# Patient Record
Sex: FEMALE | Race: AMERICAN INDIAN OR ALASKA NATIVE
[De-identification: names, ages, dates, MRNs, and addresses within clinical notes are randomized per-mention and may not be internally consistent; named-entity substitution may affect disease eponyms.]

---

## 2020-11-11 ENCOUNTER — APPOINTMENT (OUTPATIENT)
Dept: CARDIOLOGY | Facility: CLINIC | Age: 85
End: 2020-11-11
Payer: MEDICARE

## 2020-11-11 VITALS
HEART RATE: 73 BPM | BODY MASS INDEX: 19.69 KG/M2 | SYSTOLIC BLOOD PRESSURE: 170 MMHG | WEIGHT: 107 LBS | HEIGHT: 62 IN | RESPIRATION RATE: 12 BRPM | DIASTOLIC BLOOD PRESSURE: 82 MMHG

## 2020-11-11 DIAGNOSIS — R94.31 ABNORMAL ELECTROCARDIOGRAM [ECG] [EKG]: ICD-10-CM

## 2020-11-11 DIAGNOSIS — R07.9 CHEST PAIN, UNSPECIFIED: ICD-10-CM

## 2020-11-11 PROBLEM — Z00.00 ENCOUNTER FOR PREVENTIVE HEALTH EXAMINATION: Status: ACTIVE | Noted: 2020-11-11

## 2020-11-11 PROCEDURE — 99204 OFFICE O/P NEW MOD 45 MIN: CPT

## 2020-11-11 RX ORDER — HYDROXYUREA 500 MG/1
500 CAPSULE ORAL
Refills: 0 | Status: ACTIVE | COMMUNITY
Start: 2020-11-11

## 2020-11-11 RX ORDER — ASPIRIN 81 MG/1
81 TABLET ORAL
Qty: 30 | Refills: 2 | Status: ACTIVE | COMMUNITY
Start: 2020-11-11

## 2020-11-11 NOTE — DISCUSSION/SUMMARY
[FreeTextEntry1] : 85 F hyperlipidemia thrombocystosis with CP, SOB and abnormal EKG.\par CHECK ECHOCARDIOGRAM.\par CHECK NST TO ASSESS PERFUSION (limited ET).\par Continue statin.\par START ASA 81 mg daily.

## 2020-11-11 NOTE — REASON FOR VISIT
[Initial Evaluation] : an initial evaluation of [Abnormal ECG] : an abnormal ECG [Chest Pain] : chest pain [Dyspnea] : dyspnea [Hyperlipidemia] : hyperlipidemia [FreeTextEntry1] : 85 F thrombocystosis hyperlipidemia with CP, SOB and abnormal EKG.

## 2020-11-11 NOTE — HISTORY OF PRESENT ILLNESS
[FreeTextEntry1] : 1. Hyperlipidemia: on statin.\par 2. Abnormal EKG: she was found to have T wave inversions on EKG.\par Patient has noted intermittent CP, midline, substernal, exertional, progressive and worsening over several months. She has noted SOB with exertion as well. Her ET is limited by leg pain. She has noted symptoms occurring daily, increasing in frequency and duration. She also has intermittent dizziness but no palpitations.

## 2020-11-11 NOTE — PHYSICAL EXAM
[General Appearance - Well Developed] : well developed [Normal Appearance] : normal appearance [Well Groomed] : well groomed [General Appearance - Well Nourished] : well nourished [No Deformities] : no deformities [General Appearance - In No Acute Distress] : no acute distress [Normal Conjunctiva] : the conjunctiva exhibited no abnormalities [Eyelids - No Xanthelasma] : the eyelids demonstrated no xanthelasmas [Normal Oral Mucosa] : normal oral mucosa [No Oral Pallor] : no oral pallor [No Oral Cyanosis] : no oral cyanosis [Heart Rate And Rhythm] : heart rate and rhythm were normal [Heart Sounds] : normal S1 and S2 [Respiration, Rhythm And Depth] : normal respiratory rhythm and effort [Exaggerated Use Of Accessory Muscles For Inspiration] : no accessory muscle use [Auscultation Breath Sounds / Voice Sounds] : lungs were clear to auscultation bilaterally [Abdomen Soft] : soft [Abdomen Tenderness] : non-tender [Abdomen Mass (___ Cm)] : no abdominal mass palpated [Nail Clubbing] : no clubbing of the fingernails [Cyanosis, Localized] : no localized cyanosis [Petechial Hemorrhages (___cm)] : no petechial hemorrhages [Skin Color & Pigmentation] : normal skin color and pigmentation [] : no rash [No Venous Stasis] : no venous stasis [Skin Lesions] : no skin lesions [No Skin Ulcers] : no skin ulcer [No Xanthoma] : no  xanthoma was observed [Oriented To Time, Place, And Person] : oriented to person, place, and time [Affect] : the affect was normal [Mood] : the mood was normal [No Anxiety] : not feeling anxious [FreeTextEntry1] : 2/6 LAURENCE PEREZ

## 2020-12-02 ENCOUNTER — APPOINTMENT (OUTPATIENT)
Dept: CARDIOLOGY | Facility: CLINIC | Age: 85
End: 2020-12-02
Payer: MEDICARE

## 2020-12-02 VITALS
DIASTOLIC BLOOD PRESSURE: 70 MMHG | BODY MASS INDEX: 19.69 KG/M2 | WEIGHT: 107 LBS | HEIGHT: 62 IN | SYSTOLIC BLOOD PRESSURE: 160 MMHG

## 2020-12-02 DIAGNOSIS — I10 ESSENTIAL (PRIMARY) HYPERTENSION: ICD-10-CM

## 2020-12-02 DIAGNOSIS — R06.02 SHORTNESS OF BREATH: ICD-10-CM

## 2020-12-02 DIAGNOSIS — E78.5 HYPERLIPIDEMIA, UNSPECIFIED: ICD-10-CM

## 2020-12-02 PROCEDURE — 99214 OFFICE O/P EST MOD 30 MIN: CPT

## 2020-12-02 PROCEDURE — 93000 ELECTROCARDIOGRAM COMPLETE: CPT

## 2020-12-02 RX ORDER — SIMVASTATIN 20 MG/1
20 TABLET, FILM COATED ORAL DAILY
Refills: 0 | Status: ACTIVE | COMMUNITY

## 2020-12-02 RX ORDER — OLMESARTAN MEDOXOMIL AND HYDROCHLOROTHIAZIDE 20; 12.5 MG/1; MG/1
20-12.5 TABLET ORAL DAILY
Qty: 90 | Refills: 1 | Status: ACTIVE | COMMUNITY
Start: 2020-12-02 | End: 1900-01-01

## 2020-12-02 RX ORDER — METOPROLOL SUCCINATE 50 MG/1
50 TABLET, EXTENDED RELEASE ORAL DAILY
Qty: 30 | Refills: 0 | Status: ACTIVE | COMMUNITY
Start: 2020-11-11

## 2020-12-02 RX ORDER — ATORVASTATIN CALCIUM 10 MG/1
10 TABLET, FILM COATED ORAL
Refills: 0 | Status: DISCONTINUED | COMMUNITY
Start: 2020-11-11 | End: 2020-12-02

## 2020-12-02 NOTE — PHYSICAL EXAM
[General Appearance - Well Developed] : well developed [Normal Appearance] : normal appearance [Well Groomed] : well groomed [General Appearance - Well Nourished] : well nourished [No Deformities] : no deformities [General Appearance - In No Acute Distress] : no acute distress [Normal Conjunctiva] : the conjunctiva exhibited no abnormalities [Eyelids - No Xanthelasma] : the eyelids demonstrated no xanthelasmas [Normal Rate] : normal [Rhythm Regular] : regular [Normal S1] : normal S1 [Normal S2] : normal S2 [S3] : no S3 [S4] : an S4 was heard [II] : a grade 2 [2+] : left 2+ [No Pitting Edema] : no pitting edema present [Respiration, Rhythm And Depth] : normal respiratory rhythm and effort [Exaggerated Use Of Accessory Muscles For Inspiration] : no accessory muscle use [Auscultation Breath Sounds / Voice Sounds] : lungs were clear to auscultation bilaterally [Bowel Sounds] : normal bowel sounds [Abdomen Soft] : soft [Abdomen Tenderness] : non-tender [Abdomen Mass (___ Cm)] : no abdominal mass palpated [Cyanosis, Localized] : no localized cyanosis [Skin Color & Pigmentation] : normal skin color and pigmentation [] : no rash [Oriented To Time, Place, And Person] : oriented to person, place, and time

## 2020-12-02 NOTE — HISTORY OF PRESENT ILLNESS
[FreeTextEntry1] : 85-yo Sinhala-speaking female with h/o HTN for many years, still poorly controlled. Patient c/o progressive CONTRERAS in the last 1-2 years. She is able to walk slowly on a level surface but gets out of breath immediately with any incline or stairs. She denies CP now although she has experienced it in the past.\par \par She also has a h/o hyperlipidemia. She has been taking Hydroxyurea for many years, not sure why.

## 2020-12-02 NOTE — ASSESSMENT
[FreeTextEntry1] : HTN.\par SOB, hypertensive and ischemic heart disease need to be ruled out.\par Hyperlipidemia.\par ? indication for Hydroxyurea.\par \par Plan:\par Continue Metoprolol.\par Add Olmesartan HCT in the morning.\par Continue Simvastatin.\par 2D ECHO.\par Adenosine MPI.\par Blood work ordered.\par Hematology evaluation.\par \par Art Caicedo MD\par

## 2020-12-02 NOTE — REVIEW OF SYSTEMS
[Blurry Vision] : no blurred vision [Seeing Double (Diplopia)] : no diplopia [see HPI] : see HPI [Lower Ext Edema] : no extremity edema [Leg Claudication] : no intermittent leg claudication [Palpitations] : no palpitations [Skin: A Rash] : no rash: [Anxiety] : no anxiety [Easy Bleeding] : no tendency for easy bleeding [Easy Bruising] : no tendency for easy bruising [Negative] : Heme/Lymph

## 2020-12-04 ENCOUNTER — TRANSCRIPTION ENCOUNTER (OUTPATIENT)
Age: 85
End: 2020-12-04

## 2020-12-21 ENCOUNTER — APPOINTMENT (OUTPATIENT)
Dept: CARDIOLOGY | Facility: CLINIC | Age: 85
End: 2020-12-21
Payer: MEDICARE

## 2020-12-21 PROCEDURE — 93306 TTE W/DOPPLER COMPLETE: CPT

## 2020-12-23 ENCOUNTER — APPOINTMENT (OUTPATIENT)
Dept: CARDIOLOGY | Facility: CLINIC | Age: 85
End: 2020-12-23

## 2020-12-23 ENCOUNTER — RESULT REVIEW (OUTPATIENT)
Age: 85
End: 2020-12-23

## 2020-12-23 ENCOUNTER — OUTPATIENT (OUTPATIENT)
Dept: OUTPATIENT SERVICES | Facility: HOSPITAL | Age: 85
LOS: 1 days | Discharge: HOME | End: 2020-12-23
Payer: MEDICARE

## 2020-12-23 DIAGNOSIS — R06.02 SHORTNESS OF BREATH: ICD-10-CM

## 2020-12-23 PROCEDURE — 78452 HT MUSCLE IMAGE SPECT MULT: CPT | Mod: 26

## 2021-02-19 ENCOUNTER — APPOINTMENT (OUTPATIENT)
Dept: CARDIOLOGY | Facility: CLINIC | Age: 86
End: 2021-02-19

## 2022-05-26 ENCOUNTER — TRANSCRIPTION ENCOUNTER (OUTPATIENT)
Age: 87
End: 2022-05-26

## 2023-10-03 ENCOUNTER — EMERGENCY (EMERGENCY)
Facility: HOSPITAL | Age: 88
LOS: 0 days | Discharge: HOME | End: 2023-10-03
Attending: EMERGENCY MEDICINE
Payer: MEDICARE

## 2023-10-03 VITALS
HEART RATE: 94 BPM | OXYGEN SATURATION: 98 % | TEMPERATURE: 97 F | SYSTOLIC BLOOD PRESSURE: 143 MMHG | DIASTOLIC BLOOD PRESSURE: 66 MMHG | WEIGHT: 89.51 LBS | RESPIRATION RATE: 14 BRPM

## 2023-10-03 VITALS — SYSTOLIC BLOOD PRESSURE: 138 MMHG | HEART RATE: 72 BPM | RESPIRATION RATE: 18 BRPM | DIASTOLIC BLOOD PRESSURE: 72 MMHG

## 2023-10-03 DIAGNOSIS — H53.8 OTHER VISUAL DISTURBANCES: ICD-10-CM

## 2023-10-03 DIAGNOSIS — E78.5 HYPERLIPIDEMIA, UNSPECIFIED: ICD-10-CM

## 2023-10-03 DIAGNOSIS — H43.11 VITREOUS HEMORRHAGE, RIGHT EYE: ICD-10-CM

## 2023-10-03 DIAGNOSIS — H40.9 UNSPECIFIED GLAUCOMA: ICD-10-CM

## 2023-10-03 DIAGNOSIS — D75.839 THROMBOCYTOSIS, UNSPECIFIED: ICD-10-CM

## 2023-10-03 LAB
ACANTHOCYTES BLD QL SMEAR: SLIGHT — SIGNIFICANT CHANGE UP
ALBUMIN SERPL ELPH-MCNC: 4.2 G/DL — SIGNIFICANT CHANGE UP (ref 3.5–5.2)
ALP SERPL-CCNC: 46 U/L — SIGNIFICANT CHANGE UP (ref 30–115)
ALT FLD-CCNC: 11 U/L — SIGNIFICANT CHANGE UP (ref 0–41)
ANION GAP SERPL CALC-SCNC: 9 MMOL/L — SIGNIFICANT CHANGE UP (ref 7–14)
ANISOCYTOSIS BLD QL: SLIGHT — SIGNIFICANT CHANGE UP
APTT BLD: 35 SEC — SIGNIFICANT CHANGE UP (ref 27–39.2)
AST SERPL-CCNC: 30 U/L — SIGNIFICANT CHANGE UP (ref 0–41)
BASOPHILS # BLD AUTO: 0.1 K/UL — SIGNIFICANT CHANGE UP (ref 0–0.2)
BASOPHILS NFR BLD AUTO: 1.7 % — HIGH (ref 0–1)
BILIRUB SERPL-MCNC: <0.2 MG/DL — SIGNIFICANT CHANGE UP (ref 0.2–1.2)
BUN SERPL-MCNC: 23 MG/DL — HIGH (ref 10–20)
BURR CELLS BLD QL SMEAR: PRESENT — SIGNIFICANT CHANGE UP
CALCIUM SERPL-MCNC: 8.8 MG/DL — SIGNIFICANT CHANGE UP (ref 8.4–10.5)
CHLORIDE SERPL-SCNC: 103 MMOL/L — SIGNIFICANT CHANGE UP (ref 98–110)
CO2 SERPL-SCNC: 27 MMOL/L — SIGNIFICANT CHANGE UP (ref 17–32)
CREAT SERPL-MCNC: 0.8 MG/DL — SIGNIFICANT CHANGE UP (ref 0.7–1.5)
DACRYOCYTES BLD QL SMEAR: SLIGHT — SIGNIFICANT CHANGE UP
EGFR: 71 ML/MIN/1.73M2 — SIGNIFICANT CHANGE UP
EOSINOPHIL # BLD AUTO: 0.1 K/UL — SIGNIFICANT CHANGE UP (ref 0–0.7)
EOSINOPHIL NFR BLD AUTO: 1.8 % — SIGNIFICANT CHANGE UP (ref 0–8)
GIANT PLATELETS BLD QL SMEAR: PRESENT — SIGNIFICANT CHANGE UP
GLUCOSE SERPL-MCNC: 135 MG/DL — HIGH (ref 70–99)
HCT VFR BLD CALC: 32.4 % — LOW (ref 37–47)
HGB BLD-MCNC: 10.4 G/DL — LOW (ref 12–16)
INR BLD: 0.91 RATIO — SIGNIFICANT CHANGE UP (ref 0.65–1.3)
LYMPHOCYTES # BLD AUTO: 0.49 K/UL — LOW (ref 1.2–3.4)
LYMPHOCYTES # BLD AUTO: 8.6 % — LOW (ref 20.5–51.1)
MACROCYTES BLD QL: SLIGHT — SIGNIFICANT CHANGE UP
MANUAL SMEAR VERIFICATION: SIGNIFICANT CHANGE UP
MCHC RBC-ENTMCNC: 32.1 G/DL — SIGNIFICANT CHANGE UP (ref 32–37)
MCHC RBC-ENTMCNC: 35.4 PG — HIGH (ref 27–31)
MCV RBC AUTO: 110.2 FL — HIGH (ref 81–99)
MONOCYTES # BLD AUTO: 0.25 K/UL — SIGNIFICANT CHANGE UP (ref 0.1–0.6)
MONOCYTES NFR BLD AUTO: 4.3 % — SIGNIFICANT CHANGE UP (ref 1.7–9.3)
NEUTROPHILS # BLD AUTO: 4.13 K/UL — SIGNIFICANT CHANGE UP (ref 1.4–6.5)
NEUTROPHILS NFR BLD AUTO: 72.4 % — SIGNIFICANT CHANGE UP (ref 42.2–75.2)
OVALOCYTES BLD QL SMEAR: SLIGHT — SIGNIFICANT CHANGE UP
PLAT MORPH BLD: ABNORMAL
PLATELET # BLD AUTO: 480 K/UL — HIGH (ref 130–400)
PMV BLD: 9.7 FL — SIGNIFICANT CHANGE UP (ref 7.4–10.4)
POIKILOCYTOSIS BLD QL AUTO: SLIGHT — SIGNIFICANT CHANGE UP
POLYCHROMASIA BLD QL SMEAR: SLIGHT — SIGNIFICANT CHANGE UP
POTASSIUM SERPL-MCNC: 5.2 MMOL/L — HIGH (ref 3.5–5)
POTASSIUM SERPL-SCNC: 5.2 MMOL/L — HIGH (ref 3.5–5)
PROT SERPL-MCNC: 6.7 G/DL — SIGNIFICANT CHANGE UP (ref 6–8)
PROTHROM AB SERPL-ACNC: 10.3 SEC — SIGNIFICANT CHANGE UP (ref 9.95–12.87)
RBC # BLD: 2.94 M/UL — LOW (ref 4.2–5.4)
RBC # FLD: 12.8 % — SIGNIFICANT CHANGE UP (ref 11.5–14.5)
RBC BLD AUTO: ABNORMAL
SMUDGE CELLS # BLD: PRESENT — SIGNIFICANT CHANGE UP
SODIUM SERPL-SCNC: 139 MMOL/L — SIGNIFICANT CHANGE UP (ref 135–146)
STOMATOCYTES BLD QL SMEAR: SLIGHT — SIGNIFICANT CHANGE UP
TROPONIN T SERPL-MCNC: <0.01 NG/ML — SIGNIFICANT CHANGE UP
VARIANT LYMPHS # BLD: 11.2 % — HIGH (ref 0–5)
WBC # BLD: 5.7 K/UL — SIGNIFICANT CHANGE UP (ref 4.8–10.8)
WBC # FLD AUTO: 5.7 K/UL — SIGNIFICANT CHANGE UP (ref 4.8–10.8)

## 2023-10-03 PROCEDURE — 82962 GLUCOSE BLOOD TEST: CPT

## 2023-10-03 PROCEDURE — 70450 CT HEAD/BRAIN W/O DYE: CPT | Mod: 26,MA,XU

## 2023-10-03 PROCEDURE — 70498 CT ANGIOGRAPHY NECK: CPT | Mod: MA

## 2023-10-03 PROCEDURE — 36415 COLL VENOUS BLD VENIPUNCTURE: CPT

## 2023-10-03 PROCEDURE — 70496 CT ANGIOGRAPHY HEAD: CPT | Mod: MA

## 2023-10-03 PROCEDURE — 0042T: CPT | Mod: MA

## 2023-10-03 PROCEDURE — 70498 CT ANGIOGRAPHY NECK: CPT | Mod: 26,MA

## 2023-10-03 PROCEDURE — 99283 EMERGENCY DEPT VISIT LOW MDM: CPT

## 2023-10-03 PROCEDURE — 76512 OPH US DX B-SCAN: CPT | Mod: 26,RT

## 2023-10-03 PROCEDURE — 85730 THROMBOPLASTIN TIME PARTIAL: CPT

## 2023-10-03 PROCEDURE — 93010 ELECTROCARDIOGRAM REPORT: CPT

## 2023-10-03 PROCEDURE — 85025 COMPLETE CBC W/AUTO DIFF WBC: CPT

## 2023-10-03 PROCEDURE — 99285 EMERGENCY DEPT VISIT HI MDM: CPT | Mod: 25

## 2023-10-03 PROCEDURE — 76512 OPH US DX B-SCAN: CPT | Mod: RT

## 2023-10-03 PROCEDURE — 70496 CT ANGIOGRAPHY HEAD: CPT | Mod: 26,MA

## 2023-10-03 PROCEDURE — 80053 COMPREHEN METABOLIC PANEL: CPT

## 2023-10-03 PROCEDURE — 93005 ELECTROCARDIOGRAM TRACING: CPT

## 2023-10-03 PROCEDURE — 70450 CT HEAD/BRAIN W/O DYE: CPT | Mod: MA

## 2023-10-03 PROCEDURE — 99285 EMERGENCY DEPT VISIT HI MDM: CPT

## 2023-10-03 PROCEDURE — 84484 ASSAY OF TROPONIN QUANT: CPT

## 2023-10-03 PROCEDURE — 85610 PROTHROMBIN TIME: CPT

## 2023-10-03 NOTE — ED PROVIDER NOTE - ATTENDING CONTRIBUTION TO CARE
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  Patient denies any eye pain.  No headache.  No speech changes.  No paresthesias or motor weakness.  No ataxia or dizziness.  No recent eye trauma.  No diplopia.  Patient had cataract surgery to the right eye.  Stroke code was activated in triage.  Vitals noted.  CONSTITUTIONAL: Well-appearing; thin; in no apparent distress.   HEAD: Normocephalic; atraumatic.   EYES: PERRL; EOM intact. Conjunctiva normal B/L. No APD.   ENT: Normal pharynx with no tonsillar hypertrophy. MMM.  NECK: Supple; non-tender; no cervical lymphadenopathy.   CHEST: Normal chest excursion with respiration.   CARDIOVASCULAR: Normal S1, S2; no murmurs, rubs, or gallops.   RESPIRATORY: Normal chest excursion with respiration; breath sounds clear and equal bilaterally; no wheezes, rhonchi, or rales.  GI/: Normal bowel sounds; non-distended; non-tender.  BACK: No evidence of trauma or deformity. Non-tender to palpation. No CVA tenderness.   EXT: Normal ROM in all four extremities; non-tender to palpation; distal pulses are normal. No leg edema B/L.   SKIN: Normal for age and race; warm; dry; good turgor.  NEURO: A & O x 4; CN 2-12 intact. R eye vision to movement only. L eye vision normal. No facial droop.  5/5 motor strength bilateral upper and bilateral lower extremities.  Normal finger-nose bilaterally.  No sensory deficit.

## 2023-10-03 NOTE — CONSULT NOTE ADULT - SUBJECTIVE AND OBJECTIVE BOX
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    Vision loss noted at 12:00 PM today (10/3/23), patient called as stroke code when evaluated in ED. Stroke code workup imaging negative, TPA not administered due to vitreous hemorrhage identified by ED attending at time of initial workup. Patient reports that since onset of vision decline, vision has gradually improved. Vision OD currently continues to be blurry but is improving. Patient reports at time of onset, patient was able to see shadows and shapes, not complete loss of vision. Patient denies pain, flashes, floaters, sudden loss of vision. Patient denies eyedrop use currently.    Examination:  External: unremarkable OU    Anterior examination:  Lids/Lashes: Unremarkable OU  Conjunctiva: White and Quiet OU  Cornea: Clear OU  Anterior Chamber: Formed OU  Iris: Iris tear 5:00 position OD, surgical pupil slightly peaked OD, round and reactive OS  Lens: PCIOL OD, 1-2+ NSC with cortical involvement OS  Vitreous: Hemorrhage OD, PVD OS    Dilated Examination:  Examination limited OD due to vitreous hemorrhage  Disc: C/D 0.4 OU, significant peripapillary atrophy  Macula: Flat OS, unable to assess OD  Vessels: unremarkable OS, unable to assess OD  Periphery: Unremarkable OS, unable to assess OD    B-Scan:  OD: PVD, vitreous hemorrhage concentrated inferiorly, no evidence of RD  OS: PVD, no evidence of RD This is a 87 yo F with PMH HLD, thrombocytosis, 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    Vision loss noted at 12:00 PM today (10/3/23), patient called as stroke code when evaluated in ED. Stroke code workup imaging negative, TPA not administered due to vitreous hemorrhage identified by ED attending at time of initial workup. Patient reports that since onset of vision decline, vision has gradually improved. Vision OD currently continues to be blurry but is improving. Patient reports at time of onset, patient was able to see shadows and shapes, not complete loss of vision. Patient denies pain, flashes, floaters, sudden loss of vision. Patient denies eyedrop use currently.    Examination:  Vision: 20/200 PH NI OD; 20/40 PH NI OS  EOM Full OU without restriction  Color plates full OU  IOP 24/12  Pupils equal round and reactive OU    External: unremarkable OU    Anterior examination:  Lids/Lashes: Unremarkable OU  Conjunctiva: White and Quiet OU  Cornea: Clear OU  Anterior Chamber: Formed OU  Iris: Iris tear 5:00 position OD, surgical pupil slightly peaked OD, round and reactive OS  Lens: PCIOL OD, 1-2+ NSC with cortical involvement OS  Vitreous: Hemorrhage OD, PVD OS    Dilated Examination:  Examination limited OD due to vitreous hemorrhage  Disc: C/D 0.4 OU, significant peripapillary atrophy  Macula: Flat OS, unable to assess OD  Vessels: unremarkable OS, unable to assess OD  Periphery: Unremarkable OS, unable to assess OD    B-Scan:  OD: PVD, vitreous hemorrhage concentrated inferiorly, no evidence of RD  OS: PVD, no evidence of RD

## 2023-10-03 NOTE — ED PROVIDER NOTE - CLINICAL SUMMARY MEDICAL DECISION MAKING FREE TEXT BOX
88-year-old female presents to the ED for right eye vision loss.  Stroke code was called.  Last well-known time at 12:30 PM.  Concern for central artery retinal occlusion and was consulted ophthalmology at bedside.  Bedside ultrasound of the right ocular region revealed a possible right vitreous hemorrhage.  Not a candidate for TNK.  She is on a remainder of the CT imaging unremarkable for LVO.  Case was signed out to me pending ophthalmology evaluation.  Ophthalmology was consulted.  Recommendation for outpatient follow-up for possible vitreous hemorrhage overall patient is well-appearing we will follow-up with outpatient ophthalmology.  Precautions for vitreous hemorrhage/detachment advised patient.  Require to follow-up in the next 1 to 2 days.  Patient understood plan.  Family at bedside understood plan.  Discharged home.

## 2023-10-03 NOTE — CONSULT NOTE ADULT - ATTENDING COMMENTS
Monocular vision loss OD, is s/p CE OD, vitreous hemorrhage on B scan. 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 The elevated IOP with vitreous hemorrhage may  be evidence of an UGH Syndrome, needs exam in office with slit lamp, but can explain the hemorrhage, elevated IOP and the implant lens movement in association with the exfoliative material on the iris. Will see in the [facility] next

## 2023-10-03 NOTE — CONSULT NOTE ADULT - SUBJECTIVE AND OBJECTIVE BOX
**STROKE CODE CONSULT NOTE**    Last known well time:     HPI:  The patient is a 88 year old female     PAST MEDICAL & SURGICAL HISTORY:      FAMILY HISTORY:      SOCIAL HISTORY:  Denies smoking, drinking, or drug use    ROS:  Constitutional: No fever, weight loss or fatigue  Eyes: No eye pain, visual disturbances, or discharge  ENMT:  No difficulty hearing, tinnitus, vertigo; No sinus or throat pain  Neck: No pain or stiffness  Respiratory: No cough, wheezing, chills or hemoptysis  Cardiovascular: No chest pain, palpitations, shortness of breath, dizziness or leg swelling  Gastrointestinal: No abdominal pain. No nausea, vomiting or hematemesis; No diarrhea or constipation. Nohematochezia.  Genitourinary: No dysuria, frequency, hematuria or incontinence  Neurological: As per HPI      MEDICATIONS  (STANDING):    MEDICATIONS  (PRN):      Allergies    No Known Allergies    Intolerances        Vital Signs Last 24 Hrs  T(C): 36.1 (03 Oct 2023 15:31), Max: 36.1 (03 Oct 2023 15:31)  T(F): 97 (03 Oct 2023 15:31), Max: 97 (03 Oct 2023 15:31)  HR: 85 (03 Oct 2023 16:33) (85 - 94)  BP: 156/74 (03 Oct 2023 16:33) (143/66 - 156/74)  BP(mean): --  RR: 18 (03 Oct 2023 16:33) (14 - 18)  SpO2: 99% (03 Oct 2023 16:33) (98% - 99%)    Parameters below as of 03 Oct 2023 16:33  Patient On (Oxygen Delivery Method): room air        PHYSICAL EXAM:  Constitutional: WDWN; NAD    Neurologic:  -Mental status: Awake, alert and oriented to person, age, and month. Speech is fluent with intact naming, able to describe picture in full.  Recent and remote memory intact.  Attention/concentration intact.  No dysarthria, no aphasia.  Fund of knowledge appropriate.    -Cranial nerves:  II: Visual fields full to confrontation. Pupils PERRL  III, IV, VI: extraocular movements are intact without nystagmus  V: Facial sensation intact V1 through V3 intact bilaterally  VII: Face is symmetric with normal eye closure and smile.  VIII: hearing is intact to finger rub  IX, X: uvula is midline and soft palate rises symmetrically  XI: Head turning and shoulder shrug intact  XII: Tongue protrudes in the midline    -Motor:  Normal bulk and tone, strength 5/5 in bilateral upper and lower extremities.   strength 5/5.    -Sensation: Intact to light touch.  No neglect.   -Coordination: No dysmetria on finger-to-nose and heel-to-shin.  No clumsiness.  -Reflexes:downgoing toes bilaterally  -Gait: Narrow and steady. No ataxia.  Romberg negative    NIHSS: 2 for right eye vision loss     Fingerstick Blood Glucose: CAPILLARY BLOOD GLUCOSE  147 (03 Oct 2023 16:31)      POCT Blood Glucose.: 147 mg/dL (03 Oct 2023 15:33)       LABS:                        10.4   5.70  )-----------( 480      ( 03 Oct 2023 15:37 )             32.4           PT/INR - ( 03 Oct 2023 15:37 )   PT: 10.30 sec;   INR: 0.91 ratio         PTT - ( 03 Oct 2023 15:37 )  PTT:35.0 sec          RADIOLOGY & ADDITIONAL STUDIES:  < from: CT Brain Stroke Protocol (10.03.23 @ 15:46) >  MPRESSION:    No acute territorial infarct, intracranial hemorrhage, or midline shift.    Mild chronic microvascular ischemic changes.    Communication: The summary of above findings werediscussed with readback   confirmation with Dr. Viera by radiologist Dr. Esquivel on 10/3/2023 at   4:04 PM.    IV-tPA (Y/N):             no                      Reason IV-tPA not given: confirmed right eye vitreous hemorrhage    ASSESSMENT/PLAN:    88y Female w/ PMH coming in with            **STROKE CODE CONSULT NOTE**    Last known well time: 12:30pm     HPI:  The patient is a 88 year old female with PMH of HLD who presented to the ED due to acute vision loss of the right eye. Stroke code in the ED. The patient states that she felt normal when suddenly at 12:30 when she was playing cards she could no longer see out of her right eye. States that vision in left eye is unaffected, however, cannot see out of the right eye. Denies any other symptoms such as denies acute onset of numbness, weakness, tingling, slurred speech, double vision, dizziness, headache. No history of stroke. Takes ASA 81mg daily.      PAST MEDICAL & SURGICAL HISTORY:      FAMILY HISTORY:      SOCIAL HISTORY:  Denies smoking, drinking, or drug use    ROS:  as per HPI      MEDICATIONS  (STANDING):    MEDICATIONS  (PRN):      Allergies    No Known Allergies    Intolerances        Vital Signs Last 24 Hrs  T(C): 36.1 (03 Oct 2023 15:31), Max: 36.1 (03 Oct 2023 15:31)  T(F): 97 (03 Oct 2023 15:31), Max: 97 (03 Oct 2023 15:31)  HR: 85 (03 Oct 2023 16:33) (85 - 94)  BP: 156/74 (03 Oct 2023 16:33) (143/66 - 156/74)  BP(mean): --  RR: 18 (03 Oct 2023 16:33) (14 - 18)  SpO2: 99% (03 Oct 2023 16:33) (98% - 99%)    Parameters below as of 03 Oct 2023 16:33  Patient On (Oxygen Delivery Method): room air        PHYSICAL EXAM:  Constitutional: WDWN; NAD    Neurologic:  -Mental status: Awake, alert and oriented to person, age, and month. Speech is fluent with intact naming, able to describe picture in full.  Recent and remote memory intact.  Attention/concentration intact.  No dysarthria, no aphasia.  Fund of knowledge appropriate.    -Cranial nerves:  II: absent finger counting in the right eye. able to finger count in the left.   III, IV, VI: extraocular movements are intact without nystagmus  V: Facial sensation intact V1 through V3 intact bilaterally  VII: Face is symmetric with normal eye closure and smile.  -Motor:  Normal bulk and tone, strength 5/5 in bilateral upper and lower extremities.   strength 5/5.    -Sensation: Intact to light touch.  No neglect.   -Coordination: No dysmetria on finger-to-nose and heel-to-shin.  No clumsiness.  -Reflexes: downgoing toes bilaterally  -Gait: Narrow and steady. No ataxia.  Romberg negative    NIHSS: 2 for right eye vision loss     Fingerstick Blood Glucose: CAPILLARY BLOOD GLUCOSE  147 (03 Oct 2023 16:31)      POCT Blood Glucose.: 147 mg/dL (03 Oct 2023 15:33)       LABS:                        10.4   5.70  )-----------( 480      ( 03 Oct 2023 15:37 )             32.4           PT/INR - ( 03 Oct 2023 15:37 )   PT: 10.30 sec;   INR: 0.91 ratio         PTT - ( 03 Oct 2023 15:37 )  PTT:35.0 sec          RADIOLOGY & ADDITIONAL STUDIES:  < from: CT Brain Stroke Protocol (10.03.23 @ 15:46) >  MPRESSION:    No acute territorial infarct, intracranial hemorrhage, or midline shift.    Mild chronic microvascular ischemic changes.    Communication: The summary of above findings werediscussed with readback   confirmation with Dr. Viera by radiologist Dr. Esquivel on 10/3/2023 at   4:04 PM.    < from: CT Angio Neck Stroke Protocol w/ IV Cont (10.03.23 @ 17:12) >  IMPRESSION:    CT PERFUSION:  No perfusion deficits to suggest areas of completed infarction or at risk   territory.    CTA HEAD/NECK:  No large vessel occlusion, aneurysm, or vascular malformation.    Partially imaged groundglass nodules in the left lower lobe which may be   inflammatory etiology. Recommended follow-up with dedicated chest CT.    Incidentally noted mildly expansile sclerotic changes in the right   mandible without osseous erosion or surrounding stranding. Differential   considerations include but not limited to fibrous dysplasia, condensing   osteitis, cemento-osseous dysplasia, less likely chronic osteomyelitis.   Further characterization with nuclear medicine bone scan can be   considered.    IV-tPA (Y/N):             no                      Reason IV-tPA not given: confirmed right eye vitreous hemorrhage

## 2023-10-03 NOTE — CONSULT NOTE ADULT - NS ATTEND AMEND GEN_ALL_CORE FT
Pt is a 87 yo F who presented with right eye sudden vision loss. LKW 1230. NIHSS 2 for OD monocular vision loss, only intact to movement, unable to perform finger counting to confrontation. Eye ultrasound revealed right vitreous hemorrhage, thus pt not an IV TNK candidate. CT head without acute process. CTA head/neck without significant flow limiting stenosis. No further imaging from stroke standpoint, management as per ED/Ophthalmology.

## 2023-10-03 NOTE — CONSULT NOTE ADULT - ASSESSMENT
1) Vitreous Hemorrhage 1) Vitreous Hemorrhage  - Visualized on dilated examination  - In the absence of medical history of diabetes, concern for retinal tear as etiology  - Dilated examination view obstructed by hemorrhage  - B-scan performed without evidence of retinal detachment or tear, patient found to have PVD bilaterally  - Vitreous hemorrhage precautions reviewed with patient: No heavy lifting, straining, bending; keep head of bed elevated; avoid aspirin, NSAID or blood thinners if medically possible, verbalized understanding  - Retinal detachment precautions reviewed: Patient instructed to return to care immediately if she experiences acute worsening of vision or develops new flashes, floaters, verbalized understanding  - Patient will follow closely with ophthalmology clinic    2) Glaucoma suspect 2/2 elevated IOP  - Patient found to have 1x elevation in IOP OD  - Found in the setting of vitreous hemorrhage  - Patient to receive workup if pressure remains elevated on repeat evaluation.    Patient should follow up in ophthalmology clinic after discharge, will be contacted for appointment.    Bismark Pardo, PGY2  Discussed with Dr Venegas 1) Vitreous Hemorrhage  - Visualized on dilated examination  - In the absence of medical history of diabetes, concern for retinal tear as etiology  - Dilated examination view obstructed by hemorrhage  - B-scan performed without evidence of retinal detachment or tear, patient found to have PVD bilaterally  - Vitreous hemorrhage precautions reviewed with patient: No heavy lifting, straining, bending; keep head of bed elevated; avoid aspirin, NSAID or blood thinners if medically possible, verbalized understanding  - Retinal detachment precautions reviewed: Patient instructed to return to care immediately if she experiences acute worsening of vision or develops new flashes, floaters, verbalized understanding  - Patient will follow closely with ophthalmology clinic    2) Glaucoma suspect 2/2 elevated IOP  - Patient found to have 1x elevation in IOP OD  - Found in the setting of vitreous hemorrhage  - Patient to receive workup if pressure remains elevated on repeat evaluation.    Patient should follow up in ophthalmology clinic after discharge, will be contacted for appointment.    Bismark Pardo, PGY2  Examined with Dr. Flores  Discussed with Dr Venegas

## 2023-10-03 NOTE — ED PROVIDER NOTE - NSFOLLOWUPINSTRUCTIONS_ED_ALL_ED_FT
Vitreous hemorrhage. The new blood vessels may bleed into the clear, jellylike substance that fills the center of your eye. If the amount of bleeding is small, you might see only a few dark spots (floaters). In more-severe cases, blood can fill the vitreous cavity and completely block your vision.    Vitreous hemorrhage by itself usually doesn't cause permanent vision loss. The blood often clears from the eye within a few weeks or months. Unless your retina is damaged, your vision will likely return to its previous clarity.

## 2023-10-03 NOTE — ED PROVIDER NOTE - PATIENT PORTAL LINK FT
You can access the FollowMyHealth Patient Portal offered by [facility] by registering at the following website: [email or web address]. By joining [facility]’s FollowMyHealth portal, you will also be able to view your health information using other applications (apps) compatible with our system.

## 2023-10-03 NOTE — ED ADULT NURSE NOTE - NS ED NURSE DISCH DISPOSITION
We are committed to providing you with the best care possible. In order to help us achieve these goals please remember to bring all medications, herbal products, and over the counter supplements with you to each visit. If your provider has ordered testing for you, please be sure to follow up with our office if you have not received results within 7 days after the testing took place. *If you receive a survey after visiting one of our offices, please take time to share your experience concerning your physician office visit. These surveys are confidential and no health information about you is shared. We are eager to improve for you and we are counting on your feedback to help make that happen.
Discharged

## 2023-10-03 NOTE — ED PROVIDER NOTE - PROGRESS NOTE DETAILS
CASE D/W DR. ALMENDAREZ, WILL PERFORM OCULAR POCUS. OPTHO CONSULTED. Ocular ultrasound with right eye vitreous hemorrhage.  Patient not a candidate for TNK.  Case discussed with Dr. Stephenson and no acute neurologic issues.  Optho consulted for full consultation. Patient signed out to Dr. Faust, follow-up for Optho consultation, reassess and dispo. AE: Ophtho at bedside and evaluating patient.

## 2023-10-03 NOTE — CONSULT NOTE ADULT - ASSESSMENT
88 year old female with PMH of HLD who presented to the ED due to acute vision loss of the right eye. Stroke code in the ED. NIHSS 2 for right eye vision loss. Prior to decision of tenecetplase, right eye was examined for hemorrhage and found to have right vitreous  88 year old female with PMH of HLD who presented to the ED due to acute vision loss of the right eye. Stroke code in the ED. NIHSS 2 for right eye vision loss. CTH negative, CTA with no LVO. Prior to decision of tenecetplase, right eye was examined for hemorrhage and found to have right vitreous hemorrhage, therefore, not candidate for IV tnk or IA tnk.     Impression: right eye vision loss secondary to vitreous hemorrhage.     Follow up with optho recommendations. No further stroke recommendations, dispo as per ED.     Discussed with Dr. Stephenson

## 2023-10-03 NOTE — ED PROVIDER NOTE - OBJECTIVE STATEMENT
88-year-old female past medical history of HLD presents to the ED complaining of acute vision loss to the right eye.  Symptoms began suddenly at 12:30 PM today while she was playing cards.  Patient denies any other symptoms at this time; patient has not had any headache, dizziness, difficulty breathing, chest pain, vomiting, diarrhea, numbness, or tingling.

## 2023-10-03 NOTE — ED PROVIDER NOTE - PHYSICAL EXAMINATION
PHYSICAL EXAM: I have reviewed current vital signs.  GENERAL: NAD, well-nourished; well-developed.  HEAD:  Normocephalic, atraumatic.  EYES: Conjunctiva and sclera clear.  ENT: MMM, no erythema/exudates.  NECK: Supple, no JVD.  CHEST/LUNG: Clear to auscultation bilaterally; no wheezes, rales, or rhonchi.  HEART: Regular rate and rhythm, normal S1 and S2; no murmurs, rubs, or gallops.  ABDOMEN: Soft, nontender, nondistended.  EXTREMITIES:  2+ peripheral pulses; no clubbing, cyanosis, or edema.  PSYCH: Cooperative, appropriate, normal mood and affect.  NEUROLOGY: A&O x 3. Right eye blindness. No other deficits.   SKIN: Warm and dry.

## 2023-10-03 NOTE — ED PROVIDER NOTE - NSTIMEPROVIDERCAREINITIATE_GEN_ER
HISTORY OF PRESENT ILLNESS:  Manuel Gasca returns.  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  He called in recently and we put him on some prednisone and Levaquin.  He responded nicely to that.  He comes in today for a routine check.  He also thinks that the irrigations he is doing with gentamicin are helping clear out some of the crusts in his nose.      PHYSICAL EXAMINATION:  He is examined today.      PROCEDURE:  Topical anesthetic decongestant solution is applied, and nasal endoscopy is performed.  On the left, he has some polypoid degeneration in the anterior aspect of the middle meatus.  Minimal crusting.  On the right, he also has minimal crusting but he has a large polyp in the anterior aspect of the middle meatus similar to what I have seen in the past on him.  He has a recurrent polyp in this area.      ASSESSMENT AND PLAN:  I recommended he come in for a polyp shaving procedure here in the clinic sometime in the near future.  Right now, he is not having any evidence of complete obstruction.  His eye is in a normal position, and he is not having pain so I will not treat him right now with prednisone or Levaquin.  We will bring him back in for the polyp shaving procedure sometime in the near future.        03-Oct-2023 15:36

## 2023-10-03 NOTE — CONSULT NOTE ADULT - NS ATTEND BILL GEN_ALL_CORE
Attending to bill
Van: pain improved s/p morphine and recurred, given GI cocktail, discussed results including aortic ectasia and recommended follow up. dc

## 2023-10-06 ENCOUNTER — OUTPATIENT (OUTPATIENT)
Dept: OUTPATIENT SERVICES | Facility: HOSPITAL | Age: 88
LOS: 1 days | End: 2023-10-06
Payer: MEDICARE

## 2023-10-06 ENCOUNTER — APPOINTMENT (OUTPATIENT)
Dept: OPHTHALMOLOGY | Facility: CLINIC | Age: 88
End: 2023-10-06
Payer: MEDICARE

## 2023-10-06 DIAGNOSIS — H53.8 OTHER VISUAL DISTURBANCES: ICD-10-CM

## 2023-10-06 PROCEDURE — 92134 CPTRZ OPH DX IMG PST SGM RTA: CPT | Mod: 26

## 2023-10-06 PROCEDURE — 92004 COMPRE OPH EXAM NEW PT 1/>: CPT

## 2023-10-06 PROCEDURE — 92134 CPTRZ OPH DX IMG PST SGM RTA: CPT

## 2023-10-12 DIAGNOSIS — H26.8 OTHER SPECIFIED CATARACT: ICD-10-CM

## 2023-10-12 DIAGNOSIS — T85.398A OTHER MECHANICAL COMPLICATION OF OTHER OCULAR PROSTHETIC DEVICES, IMPLANTS AND GRAFTS, INITIAL ENCOUNTER: ICD-10-CM

## 2023-10-20 ENCOUNTER — APPOINTMENT (OUTPATIENT)
Dept: OPHTHALMOLOGY | Facility: CLINIC | Age: 88
End: 2023-10-20
Payer: MEDICARE

## 2023-10-20 ENCOUNTER — OUTPATIENT (OUTPATIENT)
Dept: OUTPATIENT SERVICES | Facility: HOSPITAL | Age: 88
LOS: 1 days | End: 2023-10-20
Payer: MEDICARE

## 2023-10-20 DIAGNOSIS — H53.8 OTHER VISUAL DISTURBANCES: ICD-10-CM

## 2023-10-20 PROCEDURE — 92012 INTRM OPH EXAM EST PATIENT: CPT

## 2023-10-30 DIAGNOSIS — H26.8 OTHER SPECIFIED CATARACT: ICD-10-CM

## 2023-10-30 DIAGNOSIS — Z98.41 CATARACT EXTRACTION STATUS, RIGHT EYE: ICD-10-CM

## 2023-10-30 DIAGNOSIS — H35.373 PUCKERING OF MACULA, BILATERAL: ICD-10-CM

## 2023-11-03 ENCOUNTER — APPOINTMENT (OUTPATIENT)
Dept: OPHTHALMOLOGY | Facility: CLINIC | Age: 88
End: 2023-11-03

## 2024-04-26 ENCOUNTER — INPATIENT (INPATIENT)
Facility: HOSPITAL | Age: 89
LOS: 3 days | Discharge: HOME HEALTH | DRG: 373 | End: 2024-04-30
Attending: SURGERY | Admitting: SURGERY
Payer: MEDICARE

## 2024-04-26 VITALS
HEART RATE: 117 BPM | SYSTOLIC BLOOD PRESSURE: 127 MMHG | OXYGEN SATURATION: 98 % | TEMPERATURE: 99 F | RESPIRATION RATE: 18 BRPM | WEIGHT: 82.89 LBS | DIASTOLIC BLOOD PRESSURE: 65 MMHG | HEIGHT: 62 IN

## 2024-04-26 DIAGNOSIS — K35.32 ACUTE APPENDICITIS WITH PERFORATION, LOCALIZED PERITONITIS, AND GANGRENE, WITHOUT ABSCESS: ICD-10-CM

## 2024-04-26 LAB
ALBUMIN SERPL ELPH-MCNC: 3.7 G/DL — SIGNIFICANT CHANGE UP (ref 3.5–5.2)
ALP SERPL-CCNC: 90 U/L — SIGNIFICANT CHANGE UP (ref 30–115)
ALT FLD-CCNC: 10 U/L — SIGNIFICANT CHANGE UP (ref 0–41)
ANION GAP SERPL CALC-SCNC: 13 MMOL/L — SIGNIFICANT CHANGE UP (ref 7–14)
ANION GAP SERPL CALC-SCNC: 19 MMOL/L — HIGH (ref 7–14)
APPEARANCE UR: CLEAR — SIGNIFICANT CHANGE UP
APTT BLD: 30 SEC — SIGNIFICANT CHANGE UP (ref 27–39.2)
APTT BLD: 30.2 SEC — SIGNIFICANT CHANGE UP (ref 27–39.2)
AST SERPL-CCNC: 25 U/L — SIGNIFICANT CHANGE UP (ref 0–41)
BASOPHILS # BLD AUTO: 0.07 K/UL — SIGNIFICANT CHANGE UP (ref 0–0.2)
BASOPHILS # BLD AUTO: 0.07 K/UL — SIGNIFICANT CHANGE UP (ref 0–0.2)
BASOPHILS NFR BLD AUTO: 0.4 % — SIGNIFICANT CHANGE UP (ref 0–1)
BASOPHILS NFR BLD AUTO: 0.5 % — SIGNIFICANT CHANGE UP (ref 0–1)
BILIRUB SERPL-MCNC: 0.4 MG/DL — SIGNIFICANT CHANGE UP (ref 0.2–1.2)
BILIRUB UR-MCNC: NEGATIVE — SIGNIFICANT CHANGE UP
BUN SERPL-MCNC: 13 MG/DL — SIGNIFICANT CHANGE UP (ref 10–20)
BUN SERPL-MCNC: 19 MG/DL — SIGNIFICANT CHANGE UP (ref 10–20)
CALCIUM SERPL-MCNC: 7.6 MG/DL — LOW (ref 8.4–10.5)
CALCIUM SERPL-MCNC: 8.4 MG/DL — SIGNIFICANT CHANGE UP (ref 8.4–10.5)
CHLORIDE SERPL-SCNC: 97 MMOL/L — LOW (ref 98–110)
CHLORIDE SERPL-SCNC: 98 MMOL/L — SIGNIFICANT CHANGE UP (ref 98–110)
CO2 SERPL-SCNC: 19 MMOL/L — SIGNIFICANT CHANGE UP (ref 17–32)
CO2 SERPL-SCNC: 21 MMOL/L — SIGNIFICANT CHANGE UP (ref 17–32)
COLOR SPEC: YELLOW — SIGNIFICANT CHANGE UP
CREAT SERPL-MCNC: 0.6 MG/DL — LOW (ref 0.7–1.5)
CREAT SERPL-MCNC: 0.8 MG/DL — SIGNIFICANT CHANGE UP (ref 0.7–1.5)
DIFF PNL FLD: ABNORMAL
EGFR: 70 ML/MIN/1.73M2 — SIGNIFICANT CHANGE UP
EGFR: 86 ML/MIN/1.73M2 — SIGNIFICANT CHANGE UP
EOSINOPHIL # BLD AUTO: 0.04 K/UL — SIGNIFICANT CHANGE UP (ref 0–0.7)
EOSINOPHIL # BLD AUTO: 0.05 K/UL — SIGNIFICANT CHANGE UP (ref 0–0.7)
EOSINOPHIL NFR BLD AUTO: 0.3 % — SIGNIFICANT CHANGE UP (ref 0–8)
EOSINOPHIL NFR BLD AUTO: 0.3 % — SIGNIFICANT CHANGE UP (ref 0–8)
GAS PNL BLDV: SIGNIFICANT CHANGE UP
GLUCOSE SERPL-MCNC: 127 MG/DL — HIGH (ref 70–99)
GLUCOSE SERPL-MCNC: 90 MG/DL — SIGNIFICANT CHANGE UP (ref 70–99)
GLUCOSE UR QL: NEGATIVE MG/DL — SIGNIFICANT CHANGE UP
HCT VFR BLD CALC: 34.1 % — LOW (ref 37–47)
HCT VFR BLD CALC: 35.7 % — LOW (ref 37–47)
HGB BLD-MCNC: 11 G/DL — LOW (ref 12–16)
HGB BLD-MCNC: 11.5 G/DL — LOW (ref 12–16)
IMM GRANULOCYTES NFR BLD AUTO: 0.6 % — HIGH (ref 0.1–0.3)
IMM GRANULOCYTES NFR BLD AUTO: 0.9 % — HIGH (ref 0.1–0.3)
INR BLD: 0.98 RATIO — SIGNIFICANT CHANGE UP (ref 0.65–1.3)
INR BLD: 0.98 RATIO — SIGNIFICANT CHANGE UP (ref 0.65–1.3)
KETONES UR-MCNC: 80 MG/DL
LEUKOCYTE ESTERASE UR-ACNC: ABNORMAL
LIDOCAIN IGE QN: 25 U/L — SIGNIFICANT CHANGE UP (ref 7–60)
LYMPHOCYTES # BLD AUTO: 0.73 K/UL — LOW (ref 1.2–3.4)
LYMPHOCYTES # BLD AUTO: 0.79 K/UL — LOW (ref 1.2–3.4)
LYMPHOCYTES # BLD AUTO: 4.3 % — LOW (ref 20.5–51.1)
LYMPHOCYTES # BLD AUTO: 5.2 % — LOW (ref 20.5–51.1)
MAGNESIUM SERPL-MCNC: 2.1 MG/DL — SIGNIFICANT CHANGE UP (ref 1.8–2.4)
MCHC RBC-ENTMCNC: 32.2 G/DL — SIGNIFICANT CHANGE UP (ref 32–37)
MCHC RBC-ENTMCNC: 32.3 G/DL — SIGNIFICANT CHANGE UP (ref 32–37)
MCHC RBC-ENTMCNC: 33.9 PG — HIGH (ref 27–31)
MCHC RBC-ENTMCNC: 34.1 PG — HIGH (ref 27–31)
MCV RBC AUTO: 105.3 FL — HIGH (ref 81–99)
MCV RBC AUTO: 105.6 FL — HIGH (ref 81–99)
MONOCYTES # BLD AUTO: 1.77 K/UL — HIGH (ref 0.1–0.6)
MONOCYTES # BLD AUTO: 1.78 K/UL — HIGH (ref 0.1–0.6)
MONOCYTES NFR BLD AUTO: 10.5 % — HIGH (ref 1.7–9.3)
MONOCYTES NFR BLD AUTO: 11.8 % — HIGH (ref 1.7–9.3)
NEUTROPHILS # BLD AUTO: 12.3 K/UL — HIGH (ref 1.4–6.5)
NEUTROPHILS # BLD AUTO: 14.2 K/UL — HIGH (ref 1.4–6.5)
NEUTROPHILS NFR BLD AUTO: 81.3 % — HIGH (ref 42.2–75.2)
NEUTROPHILS NFR BLD AUTO: 83.9 % — HIGH (ref 42.2–75.2)
NITRITE UR-MCNC: NEGATIVE — SIGNIFICANT CHANGE UP
NRBC # BLD: 0 /100 WBCS — SIGNIFICANT CHANGE UP (ref 0–0)
NRBC # BLD: 0 /100 WBCS — SIGNIFICANT CHANGE UP (ref 0–0)
PH UR: 6 — SIGNIFICANT CHANGE UP (ref 5–8)
PHOSPHATE SERPL-MCNC: 2.3 MG/DL — SIGNIFICANT CHANGE UP (ref 2.1–4.9)
PLATELET # BLD AUTO: 521 K/UL — HIGH (ref 130–400)
PLATELET # BLD AUTO: 548 K/UL — HIGH (ref 130–400)
PMV BLD: 9.4 FL — SIGNIFICANT CHANGE UP (ref 7.4–10.4)
PMV BLD: 9.6 FL — SIGNIFICANT CHANGE UP (ref 7.4–10.4)
POTASSIUM SERPL-MCNC: 4.2 MMOL/L — SIGNIFICANT CHANGE UP (ref 3.5–5)
POTASSIUM SERPL-MCNC: 5 MMOL/L — SIGNIFICANT CHANGE UP (ref 3.5–5)
POTASSIUM SERPL-SCNC: 4.2 MMOL/L — SIGNIFICANT CHANGE UP (ref 3.5–5)
POTASSIUM SERPL-SCNC: 5 MMOL/L — SIGNIFICANT CHANGE UP (ref 3.5–5)
PROT SERPL-MCNC: 6.7 G/DL — SIGNIFICANT CHANGE UP (ref 6–8)
PROT UR-MCNC: SIGNIFICANT CHANGE UP MG/DL
PROTHROM AB SERPL-ACNC: 11.2 SEC — SIGNIFICANT CHANGE UP (ref 9.95–12.87)
PROTHROM AB SERPL-ACNC: 11.2 SEC — SIGNIFICANT CHANGE UP (ref 9.95–12.87)
RBC # BLD: 3.23 M/UL — LOW (ref 4.2–5.4)
RBC # BLD: 3.39 M/UL — LOW (ref 4.2–5.4)
RBC # FLD: 16.5 % — HIGH (ref 11.5–14.5)
RBC # FLD: 16.5 % — HIGH (ref 11.5–14.5)
SODIUM SERPL-SCNC: 132 MMOL/L — LOW (ref 135–146)
SODIUM SERPL-SCNC: 135 MMOL/L — SIGNIFICANT CHANGE UP (ref 135–146)
SP GR SPEC: 1.01 — SIGNIFICANT CHANGE UP (ref 1–1.03)
UROBILINOGEN FLD QL: 0.2 MG/DL — SIGNIFICANT CHANGE UP (ref 0.2–1)
WBC # BLD: 15.12 K/UL — HIGH (ref 4.8–10.8)
WBC # BLD: 16.92 K/UL — HIGH (ref 4.8–10.8)
WBC # FLD AUTO: 15.12 K/UL — HIGH (ref 4.8–10.8)
WBC # FLD AUTO: 16.92 K/UL — HIGH (ref 4.8–10.8)

## 2024-04-26 PROCEDURE — 87070 CULTURE OTHR SPECIMN AEROBIC: CPT

## 2024-04-26 PROCEDURE — 85730 THROMBOPLASTIN TIME PARTIAL: CPT

## 2024-04-26 PROCEDURE — 87102 FUNGUS ISOLATION CULTURE: CPT

## 2024-04-26 PROCEDURE — 82607 VITAMIN B-12: CPT

## 2024-04-26 PROCEDURE — 87116 MYCOBACTERIA CULTURE: CPT

## 2024-04-26 PROCEDURE — 85025 COMPLETE CBC W/AUTO DIFF WBC: CPT

## 2024-04-26 PROCEDURE — 87205 SMEAR GRAM STAIN: CPT

## 2024-04-26 PROCEDURE — 97162 PT EVAL MOD COMPLEX 30 MIN: CPT | Mod: GP

## 2024-04-26 PROCEDURE — 87015 SPECIMEN INFECT AGNT CONCNTJ: CPT

## 2024-04-26 PROCEDURE — 83550 IRON BINDING TEST: CPT

## 2024-04-26 PROCEDURE — 84100 ASSAY OF PHOSPHORUS: CPT

## 2024-04-26 PROCEDURE — 80048 BASIC METABOLIC PNL TOTAL CA: CPT

## 2024-04-26 PROCEDURE — C1889: CPT

## 2024-04-26 PROCEDURE — 85027 COMPLETE CBC AUTOMATED: CPT

## 2024-04-26 PROCEDURE — 83735 ASSAY OF MAGNESIUM: CPT

## 2024-04-26 PROCEDURE — 88304 TISSUE EXAM BY PATHOLOGIST: CPT

## 2024-04-26 PROCEDURE — 82746 ASSAY OF FOLIC ACID SERUM: CPT

## 2024-04-26 PROCEDURE — 87077 CULTURE AEROBIC IDENTIFY: CPT

## 2024-04-26 PROCEDURE — 99285 EMERGENCY DEPT VISIT HI MDM: CPT

## 2024-04-26 PROCEDURE — 85610 PROTHROMBIN TIME: CPT

## 2024-04-26 PROCEDURE — 99223 1ST HOSP IP/OBS HIGH 75: CPT | Mod: 57

## 2024-04-26 PROCEDURE — 83540 ASSAY OF IRON: CPT

## 2024-04-26 PROCEDURE — 74177 CT ABD & PELVIS W/CONTRAST: CPT | Mod: 26,MC

## 2024-04-26 PROCEDURE — 87206 SMEAR FLUORESCENT/ACID STAI: CPT

## 2024-04-26 PROCEDURE — 36415 COLL VENOUS BLD VENIPUNCTURE: CPT

## 2024-04-26 PROCEDURE — 87075 CULTR BACTERIA EXCEPT BLOOD: CPT

## 2024-04-26 PROCEDURE — 87186 SC STD MICRODIL/AGAR DIL: CPT

## 2024-04-26 RX ORDER — METRONIDAZOLE 500 MG
500 TABLET ORAL EVERY 8 HOURS
Refills: 0 | Status: DISCONTINUED | OUTPATIENT
Start: 2024-04-27 | End: 2024-04-30

## 2024-04-26 RX ORDER — HEPARIN SODIUM 5000 [USP'U]/ML
5000 INJECTION INTRAVENOUS; SUBCUTANEOUS EVERY 8 HOURS
Refills: 0 | Status: DISCONTINUED | OUTPATIENT
Start: 2024-04-26 | End: 2024-04-30

## 2024-04-26 RX ORDER — CEFEPIME 1 G/1
2000 INJECTION, POWDER, FOR SOLUTION INTRAMUSCULAR; INTRAVENOUS ONCE
Refills: 0 | Status: COMPLETED | OUTPATIENT
Start: 2024-04-26 | End: 2024-04-26

## 2024-04-26 RX ORDER — PIPERACILLIN AND TAZOBACTAM 4; .5 G/20ML; G/20ML
3.38 INJECTION, POWDER, LYOPHILIZED, FOR SOLUTION INTRAVENOUS ONCE
Refills: 0 | Status: COMPLETED | OUTPATIENT
Start: 2024-04-26 | End: 2024-04-26

## 2024-04-26 RX ORDER — METRONIDAZOLE 500 MG
TABLET ORAL
Refills: 0 | Status: DISCONTINUED | OUTPATIENT
Start: 2024-04-26 | End: 2024-04-30

## 2024-04-26 RX ORDER — KETOROLAC TROMETHAMINE 30 MG/ML
15 SYRINGE (ML) INJECTION EVERY 6 HOURS
Refills: 0 | Status: DISCONTINUED | OUTPATIENT
Start: 2024-04-26 | End: 2024-04-30

## 2024-04-26 RX ORDER — CEFTRIAXONE 500 MG/1
INJECTION, POWDER, FOR SOLUTION INTRAMUSCULAR; INTRAVENOUS
Refills: 0 | Status: DISCONTINUED | OUTPATIENT
Start: 2024-04-26 | End: 2024-04-30

## 2024-04-26 RX ORDER — CHLORHEXIDINE GLUCONATE 213 G/1000ML
1 SOLUTION TOPICAL
Refills: 0 | Status: DISCONTINUED | OUTPATIENT
Start: 2024-04-26 | End: 2024-04-30

## 2024-04-26 RX ORDER — SODIUM CHLORIDE 9 MG/ML
1000 INJECTION INTRAMUSCULAR; INTRAVENOUS; SUBCUTANEOUS ONCE
Refills: 0 | Status: COMPLETED | OUTPATIENT
Start: 2024-04-26 | End: 2024-04-26

## 2024-04-26 RX ORDER — MORPHINE SULFATE 50 MG/1
2 CAPSULE, EXTENDED RELEASE ORAL ONCE
Refills: 0 | Status: DISCONTINUED | OUTPATIENT
Start: 2024-04-26 | End: 2024-04-26

## 2024-04-26 RX ORDER — SODIUM CHLORIDE 9 MG/ML
1000 INJECTION, SOLUTION INTRAVENOUS
Refills: 0 | Status: DISCONTINUED | OUTPATIENT
Start: 2024-04-26 | End: 2024-04-28

## 2024-04-26 RX ORDER — CEFTRIAXONE 500 MG/1
1000 INJECTION, POWDER, FOR SOLUTION INTRAMUSCULAR; INTRAVENOUS ONCE
Refills: 0 | Status: COMPLETED | OUTPATIENT
Start: 2024-04-26 | End: 2024-04-26

## 2024-04-26 RX ORDER — HYDROXYUREA 500 MG/1
500 CAPSULE ORAL DAILY
Refills: 0 | Status: DISCONTINUED | OUTPATIENT
Start: 2024-04-26 | End: 2024-04-29

## 2024-04-26 RX ORDER — SIMVASTATIN 20 MG/1
20 TABLET, FILM COATED ORAL AT BEDTIME
Refills: 0 | Status: DISCONTINUED | OUTPATIENT
Start: 2024-04-26 | End: 2024-04-30

## 2024-04-26 RX ORDER — CEFTRIAXONE 500 MG/1
1000 INJECTION, POWDER, FOR SOLUTION INTRAMUSCULAR; INTRAVENOUS EVERY 24 HOURS
Refills: 0 | Status: DISCONTINUED | OUTPATIENT
Start: 2024-04-27 | End: 2024-04-30

## 2024-04-26 RX ORDER — METRONIDAZOLE 500 MG
500 TABLET ORAL ONCE
Refills: 0 | Status: COMPLETED | OUTPATIENT
Start: 2024-04-26 | End: 2024-04-26

## 2024-04-26 RX ORDER — ONDANSETRON 8 MG/1
4 TABLET, FILM COATED ORAL ONCE
Refills: 0 | Status: COMPLETED | OUTPATIENT
Start: 2024-04-26 | End: 2024-04-26

## 2024-04-26 RX ORDER — ENOXAPARIN SODIUM 100 MG/ML
40 INJECTION SUBCUTANEOUS EVERY 24 HOURS
Refills: 0 | Status: DISCONTINUED | OUTPATIENT
Start: 2024-04-26 | End: 2024-04-26

## 2024-04-26 RX ORDER — IOHEXOL 300 MG/ML
30 INJECTION, SOLUTION INTRAVENOUS ONCE
Refills: 0 | Status: COMPLETED | OUTPATIENT
Start: 2024-04-26 | End: 2024-04-26

## 2024-04-26 RX ORDER — ACETAMINOPHEN 500 MG
650 TABLET ORAL EVERY 6 HOURS
Refills: 0 | Status: DISCONTINUED | OUTPATIENT
Start: 2024-04-26 | End: 2024-04-30

## 2024-04-26 RX ADMIN — SODIUM CHLORIDE 1000 MILLILITER(S): 9 INJECTION INTRAMUSCULAR; INTRAVENOUS; SUBCUTANEOUS at 12:30

## 2024-04-26 RX ADMIN — CEFTRIAXONE 100 MILLIGRAM(S): 500 INJECTION, POWDER, FOR SOLUTION INTRAMUSCULAR; INTRAVENOUS at 23:34

## 2024-04-26 RX ADMIN — CEFEPIME 2000 MILLIGRAM(S): 1 INJECTION, POWDER, FOR SOLUTION INTRAMUSCULAR; INTRAVENOUS at 13:37

## 2024-04-26 RX ADMIN — SODIUM CHLORIDE 1000 MILLILITER(S): 9 INJECTION INTRAMUSCULAR; INTRAVENOUS; SUBCUTANEOUS at 15:56

## 2024-04-26 RX ADMIN — MORPHINE SULFATE 2 MILLIGRAM(S): 50 CAPSULE, EXTENDED RELEASE ORAL at 10:05

## 2024-04-26 RX ADMIN — Medication 100 MILLIGRAM(S): at 13:09

## 2024-04-26 RX ADMIN — CEFEPIME 100 MILLIGRAM(S): 1 INJECTION, POWDER, FOR SOLUTION INTRAMUSCULAR; INTRAVENOUS at 13:09

## 2024-04-26 RX ADMIN — SODIUM CHLORIDE 2000 MILLILITER(S): 9 INJECTION INTRAMUSCULAR; INTRAVENOUS; SUBCUTANEOUS at 10:07

## 2024-04-26 RX ADMIN — Medication 100 MILLIGRAM(S): at 23:34

## 2024-04-26 RX ADMIN — IOHEXOL 30 MILLILITER(S): 300 INJECTION, SOLUTION INTRAVENOUS at 10:12

## 2024-04-26 RX ADMIN — HEPARIN SODIUM 5000 UNIT(S): 5000 INJECTION INTRAVENOUS; SUBCUTANEOUS at 21:38

## 2024-04-26 RX ADMIN — MORPHINE SULFATE 2 MILLIGRAM(S): 50 CAPSULE, EXTENDED RELEASE ORAL at 12:30

## 2024-04-26 RX ADMIN — Medication 15 MILLIGRAM(S): at 22:20

## 2024-04-26 RX ADMIN — SIMVASTATIN 20 MILLIGRAM(S): 20 TABLET, FILM COATED ORAL at 21:38

## 2024-04-26 RX ADMIN — PIPERACILLIN AND TAZOBACTAM 200 GRAM(S): 4; .5 INJECTION, POWDER, LYOPHILIZED, FOR SOLUTION INTRAVENOUS at 17:40

## 2024-04-26 NOTE — H&P ADULT - ASSESSMENT
ASSESSMENT:  89F with PMH of HLD and anemia and PSH of bilateral oophorectomy ~20 years ago. Patient  presents to ED with one day of abdominal pain. No nausea, vomiting, or diarrhea. +constipation, Pain is on the right side of abdomen and has no alleviating factors. WCC 15 and CTAP concerning for acute perforated appendicitis.    PLAN:  # acute appendicitis  - Admit to surgical service  - Diet: NPO  - IVF  - DVT ppx  - GI ppx  - hemodynamic monitoring/vital signs q4h  - Strict Is and Os q4h  - Pain control  - activity as tolerated  - Abx: Zosyn  - incentive spirometer  - IR consult placed for possible drainage of collection  - will trial non-operative management for now, serial abdominal exams    #HLD  - cont simvastatin    #anemia  - cont home hydrea    Above plan discussed with Attending Surgeon Dr. Anderson, and patient, patient daughter at bedside.   04-26-24 @ 15:57

## 2024-04-26 NOTE — ED ADULT NURSE NOTE - CHIEF COMPLAINT QUOTE
Patient mom called stated that she has some questions regarding medication and would like a call back C/o abdominal pain and 7lb weight loss

## 2024-04-26 NOTE — ED PROVIDER NOTE - ATTENDING APP SHARED VISIT CONTRIBUTION OF CARE
89-year-old female past med history of HLD presenting for evaluation of abdominal pain associated with a few pound weight loss and no bowel movement for the past 5 days.  She reports decreased appetite but no vomiting, fever or chills, back pain, urinary complaints, blood per rectum or any other additional symptoms.  Well-nourished, well-appearing elderly female resting in stretcher, does not appear to be in any acute distress, PERRL, pink conjunctiva, MMM, lungs clear to auscultation bilaterally, equal air entry, no midline spine or  CVA TTP, RRR, well-perfused extremities, abdomen soft with generalized tenderness to palpation, nondistended, bowel sounds present throughout, patient is awake and alert, no gross neurodeficits, normal mood and affect.  Plan: Analgesia, labs, fluids, imaging, reassess.  Collateral information obtained for the patient's daughter.

## 2024-04-26 NOTE — ED PROVIDER NOTE - OBJECTIVE STATEMENT
89-year-old female with past medical history of hyperlipidemia who presents to the ED with abdominal pain.  Reports that symptoms started a week ago; pain is in her general abdominal area, constant, and there is no alleviating or worsening factor.  Also endorses constipation since a week ago.  Denies fever, shortness of breath, chest pain, nausea, vomiting, hematuria, dysuria, melena, and hematochezia.

## 2024-04-26 NOTE — H&P ADULT - NSHPPHYSICALEXAM_GEN_ALL_CORE
VITALS:  T(F): 98.1 (04-26-24 @ 15:51), Max: 98.7 (04-26-24 @ 08:56)  HR: 90 (04-26-24 @ 15:51) (90 - 117)  BP: 137/63 (04-26-24 @ 15:51) (127/65 - 137/63)  RR: 18 (04-26-24 @ 15:51) (18 - 18)  SpO2: 99% (04-26-24 @ 15:51) (98% - 99%)    PHYSICAL EXAM:  General Appearance: NAD  HEENT: Normocephalic, atraumatic, trachea midline  Heart: s1, s2,    Lungs: No increased work of breathing or accessory muscle use. Symmetric chest wall rise and fall. Bilateral breath sounds  Abdomen:  Soft, nondistended. TTP RLQ with voluntary guarding  MSK/Extremities: Warm & well-perfused.   Skin: Warm, dry. No jaundice.

## 2024-04-26 NOTE — H&P ADULT - NSHPLABSRESULTS_GEN_ALL_CORE
LAB/STUDIES:                    11.5   15.12 )-----------( 548      ( 2024 10:01 )             35.7         135  |  97<L>  |  19  ----------------------------<  90  5.0   |  19  |  0.8    Ca    8.4      2024 10:01    TPro  6.7  /  Alb  3.7  /  TBili  0.4  /  DBili  x   /  AST  25  /  ALT  10  /  AlkPhos  90      PT/INR - ( 2024 10:01 )   PT: 11.20 sec;   INR: 0.98 ratio    PTT - ( 2024 10:01 )  PTT:30.0 sec    LIVER FUNCTIONS - ( 2024 10:01 )  Alb: 3.7 g/dL / Pro: 6.7 g/dL / ALK PHOS: 90 U/L / ALT: 10 U/L / AST: 25 U/L / GGT: x           Urinalysis Basic - ( 2024 12:20 )    Color: Yellow / Appearance: Clear / S.010 / pH: x  Gluc: x / Ketone: 80 mg/dL  / Bili: Negative / Urobili: 0.2 mg/dL   Blood: x / Protein: Trace mg/dL / Nitrite: Negative   Leuk Esterase: Moderate / RBC: 2 /HPF / WBC 10 /HPF   Sq Epi: x / Non Sq Epi: 6 /HPF / Bacteria: Negative /HPF    Urinalysis with Rflx Culture (collected 2024 12:20)        IMAGING:  < from: CT Abdomen and Pelvis w/ Oral Cont and w/ IV Cont (24 @ 12:33) >  04. GALLBLADDER/BILIARY TREE: Cholelithiasis. Mild intrahepatic biliary   duct dilation. Dilated CBD measuring 1.1 cm (series 6 on image 31).  07. LYMPHADENOPATHY/RETROPERITONEUM: No lymphadenopathy.  08. BOWEL: Dilated acute appendicitis with extensive surrounding   inflammatory changes concerning for perforation. There is likely terminal   ileitis and cecitis which may be reactive. No bowel obstruction.  12. PERITONEUM/ABDOMINAL WALL: Small volume free fluid in the right   paracolic gutter and pelvis. Tiny fat-containing umbilical hernia.      IMPRESSION:  Dilated acute appendicitis with findings concerning for perforation. Terminal ileitis and cecitis are also noted likely reactive.    No bowel obstruction.    Intra and extrahepatic biliary duct dilation. Correlate with laboratory values.    --- End of Report ---  PETRA BALLARD MD; Attending Radiologist  This document has been electronically signed. 2024  1:30PM  < end of copied text >

## 2024-04-26 NOTE — CONSULT NOTE ADULT - SUBJECTIVE AND OBJECTIVE BOX
INTERVENTIONAL RADIOLOGY CONSULT:     Procedure Requested:     HPI:  89F with PMH of HLD and anemia and PSH of bilateral oophorectomy ~20 years ago. Patient had cold symptoms for the last 2 weeks and now presents to ED with one day of abdominal pain. No nausea, vomiting, or diarrhea. +constipation, Pain is on the right side of abdomen and has no alleviating factors. Patient speaks mostly German and daughter is bedside for translation. (26 Apr 2024 15:57)      PAST MEDICAL & SURGICAL HISTORY:  HLD (hyperlipidemia)      Anemia          MEDICATIONS  (STANDING):  hydroxyurea 500 milliGRAM(s) Oral daily  simvastatin 20 milliGRAM(s) Oral at bedtime  sodium chloride 0.9% Bolus 1000 milliLiter(s) IV Bolus once    MEDICATIONS  (PRN):      Allergies    No Known Allergies    Intolerances        Social History:   Smoking: Yes [ ]  No [ ]   ______pk yrs  ETOH  Yes [ ]  No [ ]  Social [ ]  DRUGS:  Yes [ ]  No [ ]  if so what______________    FAMILY HISTORY:      Physical Exam:   Vital Signs Last 24 Hrs  T(C): 36.7 (26 Apr 2024 15:51), Max: 37.1 (26 Apr 2024 08:56)  T(F): 98.1 (26 Apr 2024 15:51), Max: 98.7 (26 Apr 2024 08:56)  HR: 90 (26 Apr 2024 15:51) (90 - 117)  BP: 137/63 (26 Apr 2024 15:51) (127/65 - 137/63)  BP(mean): --  RR: 18 (26 Apr 2024 15:51) (18 - 18)  SpO2: 99% (26 Apr 2024 15:51) (98% - 99%)    Parameters below as of 26 Apr 2024 08:56  Patient On (Oxygen Delivery Method): room air        General:     Lungs:    Cardiovascular:     Abdomen:    Extremities:    Musculoskeletal:    Neuro/Psych:        Labs:                         11.5   15.12 )-----------( 548      ( 26 Apr 2024 10:01 )             35.7     04-26    135  |  97<L>  |  19  ----------------------------<  90  5.0   |  19  |  0.8    Ca    8.4      26 Apr 2024 10:01    TPro  6.7  /  Alb  3.7  /  TBili  0.4  /  DBili  x   /  AST  25  /  ALT  10  /  AlkPhos  90  04-26    PT/INR - ( 26 Apr 2024 10:01 )   PT: 11.20 sec;   INR: 0.98 ratio         PTT - ( 26 Apr 2024 10:01 )  PTT:30.0 sec    Pertinent labs:                      11.5   15.12 )-----------( 548      ( 26 Apr 2024 10:01 )             35.7       04-26    135  |  97<L>  |  19  ----------------------------<  90  5.0   |  19  |  0.8    Ca    8.4      26 Apr 2024 10:01    TPro  6.7  /  Alb  3.7  /  TBili  0.4  /  DBili  x   /  AST  25  /  ALT  10  /  AlkPhos  90  04-26      PT/INR - ( 26 Apr 2024 10:01 )   PT: 11.20 sec;   INR: 0.98 ratio         PTT - ( 26 Apr 2024 10:01 )  PTT:30.0 sec    Radiology & Additional Studies:     Radiology imaging reviewed.       ASSESSMENT/ PLAN:     89F with PMH of HLD and anemia presenting with abdominal pain. CT demonstrated dilated appendix with possible perforation and contained rupture.     -- Not a surgical candidate for appendectomy as per general surgery.   -- No percutaneous window for CT-guided biopsy.   -- No intervention at this time.     Thank you for the courtesy of this consult, please call [phone] with any further questions.

## 2024-04-26 NOTE — PATIENT PROFILE ADULT - FUNCTIONAL ASSESSMENT - DAILY ACTIVITY 6.
Spoke with mom who reports frequency, leaking, pain with urination. Appt sched for today.    4 = No assist / stand by assistance

## 2024-04-26 NOTE — ED PROVIDER NOTE - CLINICAL SUMMARY MEDICAL DECISION MAKING FREE TEXT BOX
89-year-old female with abdominal pain secondary to perforated appendicitis.  Patient has nontoxic appearance, labs ordered and reviewed.  Dose of antibiotic was ordered along with fluids and pain medications.  Patient reported feeling better.  Evaluated by surgery.  Collateral information obtained from the patient's daughter, patient is admitted to surgery for further management and care.

## 2024-04-26 NOTE — ED PROVIDER NOTE - DIFFERENTIAL DIAGNOSIS
Constipation, acute appendicitis, bowel obstruction, colitis, diverticulitis, abdominal abscess, gallbladder disease, less likely vascular pathology, pneumonia, UTI Differential Diagnosis

## 2024-04-26 NOTE — H&P ADULT - ATTENDING COMMENTS
This is 89 y/o female with PMH of HLD and anemia and PSH of bilateral oophorectomy ~20 years ago. Patient had cold symptoms for the last 2 weeks and now presents to ED with one day of abdominal pain. No nausea, vomiting, or diarrhea. +constipation, Pain is on the right side of abdomen and has no alleviating factors. Patient speaks mostly Swedish and daughter is bedside for translation.    PE:  AAO x3  Chest: clear.  CV: RRR  Abdomen: tender in the RLQ with rebound and palpable infiltrate.    WBC 16.9    CT was reviewed.    ASSESSMENT:  89 y/o female with Acute Appendicitis with Localized Peritonitis.  Bobbi-appendicial abscess.  Abdominal pain.  Leukocytosis.    PLAN:  - NPO, IVF  - iv Rocephin and Flagyl (had allergic reaction to Zosyn)  - DVT proph This is 88 y/o female with PMH of HLD and anemia and PSH of bilateral oophorectomy ~20 years ago. Patient had cold symptoms for the last 2 weeks and now presents to ED with one day of abdominal pain. No nausea, vomiting, or diarrhea. +constipation, Pain is on the right side of abdomen and has no alleviating factors. Patient speaks mostly Icelandic and daughter is bedside for translation.    PE:  AAO x3  Chest: clear.  CV: RRR  Abdomen: tender in the RLQ with rebound and palpable infiltrate.    WBC 16.9    CT was reviewed.    ASSESSMENT:  88 y/o female with Acute Appendicitis with Localized Peritonitis.  Bobbi-appendicial abscess.  Abdominal pain.  Leukocytosis.    PLAN:  - NPO, IVF  - iv Rocephin and Flagyl (had allergic reaction to Zosyn)  - DVT proph    Will book for urgent Laparoscopic Appendectomy, Possible Open and All Indicated Procedures.    Informed consent was obtained from the patient for the above procedure after explaining all the risks and benefits of it including but not limited to infection, bleeding and etc. She understood and agreed. All questions were answered.

## 2024-04-26 NOTE — ED PROVIDER NOTE - RHYTHM STRIP/ULTRASOUND IMAGES/CT SCAN IMAGES SHOWED
Prominent gallbladder with gallstone, liver hypodensity, significant inflammatory changes in the right lower quadrant with associated abscess formation

## 2024-04-26 NOTE — ED PROVIDER NOTE - NSCAREINITIATED _GEN_ER
Annual exam ages 21-44    Charl Riedel is a No obstetric history on file. ,  23 y.o. female   Patient's last menstrual period was 08/26/2022 (exact date). She presents for her annual checkup. Joining the [employer] soon. She is having no significant problems. With regard to the Gardasil vaccine, she has received all 3 injections. Menstrual status:    Her periods are heavy in flow. She is using three to ten pads or tampons per day, usually regular with a 26-32 day interval with 3-7 day duration. She has dysmenorrhea. She reports no premenstrual symptoms. Contraception:    The current method of family planning is none. Tried to get pregnant for 3 months and didn't. Wonders why. Has used Nexplanon for 4 months with continuous bleeding so removed. On Junel 1/20 and did well in the past.    Sexual history:    She  reports being sexually active and has had partner(s) who are male. She reports using the following method of birth control/protection: None. Medical conditions:    Since her last annual GYN exam about one year ago, she has not the following changes in her health history: none. Surgical history confirmed with patient. has no past surgical history on file. Pap and Mammogram History:    Pt is under the age of 24 so pap not performed. The patient has never had a mammogram.    Breast Cancer History/Substance Abuse: negative    Past Medical History:   Diagnosis Date    Fainting spell     has been told fainting spells or seizures lasts for 25 sec. (5 times in past month)    Headache     chronic    Palpitations     Last January 2022 when grandmother passed away    Sleep apnea     obstructive     History reviewed. No pertinent surgical history. Current Outpatient Medications   Medication Sig Dispense Refill    ascorbic acid (VITAMIN C PO) Take  by mouth daily. ashwagandha root extract 300 mg cap Take  by mouth daily.       ondansetron (ZOFRAN ODT) 4 mg disintegrating tablet Take 1 Tablet by mouth every eight (8) hours as needed for Nausea or Vomiting. 10 Tablet 0    medical supply, miscellaneous (MISCELLANEOUS MEDICAL SUPPLY) by Does Not Apply route daily. Birth control      norethindrone-ethinyl estradiol (Junel FE 1/20, 28,) 1 mg-20 mcg (21)/75 mg (7) tab Take  by mouth. Allergies: Patient has no known allergies. Tobacco History:  reports that she has never smoked. She has never used smokeless tobacco.  Alcohol Abuse:  reports that she does not currently use alcohol. Drug Abuse:  reports that she does not currently use drugs.     Family Medical/Cancer History:   Family History   Problem Relation Age of Onset    OSTEOARTHRITIS Mother     Stroke Father     Other Father         blood clots    Depression Father     Anxiety Father     Lung Disease Father         COPD        Review of Systems - History obtained from the patient  Constitutional: negative for weight loss, fever, night sweats  HEENT: negative for hearing loss, earache, congestion, snoring, sorethroat  CV: negative for chest pain, palpitations, edema  Resp: negative for cough, shortness of breath, wheezing  GI: negative for change in bowel habits, abdominal pain, black or bloody stools  : negative for frequency, dysuria, hematuria, vaginal discharge  MSK: negative for back pain, joint pain, muscle pain  Breast: negative for breast lumps, nipple discharge, galactorrhea  Skin :negative for itching, rash, hives  Neuro: negative for dizziness, headache, confusion, weakness  Psych: negative for anxiety, depression, change in mood  Heme/lymph: negative for bleeding, bruising, pallor    Physical Exam    Visit Vitals  /75   Wt 127 lb (57.6 kg)   LMP 08/26/2022 (Exact Date)   BMI 24.00 kg/m²       Constitutional  Appearance: well-nourished, well developed, alert, in no acute distress    HENT  Head and Face: appears normal    Neck  Inspection/Palpation: normal appearance, no masses or tenderness  Lymph Nodes: no lymphadenopathy present  Thyroid: gland size normal, nontender, no nodules or masses present on palpation    Chest  Respiratory Effort: breathing unlabored  Auscultation: normal breath sounds    Cardiovascular  Heart: Auscultation: regular rate and rhythm without murmur    Breasts  Inspection of Breasts: breasts symmetrical, no skin changes, no discharge present, nipple appearance normal, no skin retraction present  Palpation of Breasts and Axillae: no masses present on palpation, no breast tenderness  Axillary Lymph Nodes: no lymphadenopathy present    Gastrointestinal  Abdominal Examination: abdomen non-tender to palpation, normal bowel sounds, no masses present  Liver and spleen: no hepatomegaly present, spleen not palpable  Hernias: no hernias identified    Genitourinary  External Genitalia: normal appearance for age, no discharge present, no tenderness present, no inflammatory lesions present, no masses present, no atrophy present  Vagina: normal vaginal vault without central or paravaginal defects, no discharge present, no inflammatory lesions present, no masses present  Bladder: non-tender to palpation  Urethra: appears normal  Cervix: normal   Uterus: normal size, shape and consistency  Adnexa: no adnexal tenderness present, no adnexal masses present  Perineum: perineum within normal limits, no evidence of trauma, no rashes or skin lesions present  Anus: anus within normal limits, no hemorrhoids present  Inguinal Lymph Nodes: no lymphadenopathy present    Skin  General Inspection: no rash, no lesions identified    Neurologic/Psychiatric  Mental Status:  Orientation: grossly oriented to person, place and time  Mood and Affect: mood normal, affect appropriate    . Assessment:  Routine gynecologic examination  Her current medical status is satisfactory with no evidence of significant gynecologic issues. Discussed bleeding and periods. Plan:  Irwin Cough.   Counseled re: diet, exercise, healthy lifestyle  Return for yearly wellness visits  Gardisil counseling provided  Pt counseled regarding co-testing for high risk HPV with pap  Rec screening mammo at either 35 or 40 [address] [facility])

## 2024-04-26 NOTE — ED PROVIDER NOTE - PHYSICAL EXAMINATION
CONSTITUTIONAL: in no apparent distress.   ENT: Hearing is intact with good acuity to spoken voice.  Patient is speaking clearly, not muffled and airway is intact.   RESPIRATORY: No signs of respiratory distress. Lung sounds are clear in all lobes bilaterally without rales, rhonchi, or wheezes.  CARDIOVASCULAR: Regular rate and rhythm.   GI: tender to palpation in general abd area. Abdomen is soft, and without distention. Bowel sounds are present and normoactive in all four quadrants. No masses are noted.   BACK: No evidence of trauma or deformity. No CVA tenderness bilaterally. Normal ROM.   NEURO: A & O x 3. Normal speech. No focal deficit.  PSYCHOLOGICAL: Appropriate mood and affect. Good judgement and insight.

## 2024-04-26 NOTE — ED PROVIDER NOTE - PROGRESS NOTE DETAILS
EP: Patient reports feeling better now, pain is in right lower quadrant, she declined any additional analgesia at present.  Imaging is pending. EP: CT scan performed, official reading is pending, based on my review there is an abscess formation with significant right lower quadrant inflammatory changes, antibiotics were ordered. Spoke with surgery team who eval the patient and request patient to be admitted to surgery team.  Patient is aware of the plan and agrees.

## 2024-04-26 NOTE — H&P ADULT - HISTORY OF PRESENT ILLNESS
89F with PMH of HLD and anemia and PSH of bilateral oophorectomy ~20 years ago. Patient had cold symptoms for the last 2 weeks and now presents to ED with one day of abdominal pain. No nausea, vomiting, or diarrhea. +constipation, Pain is on the right side of abdomen and has no alleviating factors. Patient speaks mostly Indonesian and daughter is bedside for translation.

## 2024-04-26 NOTE — ED ADULT NURSE NOTE - NSFALLRISKINTERV_ED_ALL_ED

## 2024-04-26 NOTE — ED PROVIDER NOTE - WHICH SHOWED
distended gallbladder with a large gallstone, liver hypodensity, significant inflammatory changes in the right lower quadrant with abscess formation

## 2024-04-27 ENCOUNTER — RESULT REVIEW (OUTPATIENT)
Age: 89
End: 2024-04-27

## 2024-04-27 LAB
ANION GAP SERPL CALC-SCNC: 18 MMOL/L — HIGH (ref 7–14)
BUN SERPL-MCNC: 15 MG/DL — SIGNIFICANT CHANGE UP (ref 10–20)
CALCIUM SERPL-MCNC: 7.2 MG/DL — LOW (ref 8.4–10.4)
CHLORIDE SERPL-SCNC: 101 MMOL/L — SIGNIFICANT CHANGE UP (ref 98–110)
CO2 SERPL-SCNC: 18 MMOL/L — SIGNIFICANT CHANGE UP (ref 17–32)
CREAT SERPL-MCNC: 0.8 MG/DL — SIGNIFICANT CHANGE UP (ref 0.7–1.5)
EGFR: 70 ML/MIN/1.73M2 — SIGNIFICANT CHANGE UP
GLUCOSE SERPL-MCNC: 96 MG/DL — SIGNIFICANT CHANGE UP (ref 70–99)
GRAM STN FLD: ABNORMAL
HCT VFR BLD CALC: 29.8 % — LOW (ref 37–47)
HGB BLD-MCNC: 9.8 G/DL — LOW (ref 12–16)
MAGNESIUM SERPL-MCNC: 1.9 MG/DL — SIGNIFICANT CHANGE UP (ref 1.8–2.4)
MCHC RBC-ENTMCNC: 32.9 G/DL — SIGNIFICANT CHANGE UP (ref 32–37)
MCHC RBC-ENTMCNC: 33.9 PG — HIGH (ref 27–31)
MCV RBC AUTO: 103.1 FL — HIGH (ref 81–99)
NIGHT BLUE STAIN TISS: SIGNIFICANT CHANGE UP
NRBC # BLD: 0 /100 WBCS — SIGNIFICANT CHANGE UP (ref 0–0)
PHOSPHATE SERPL-MCNC: 3.6 MG/DL — SIGNIFICANT CHANGE UP (ref 2.1–4.9)
PLATELET # BLD AUTO: 529 K/UL — HIGH (ref 130–400)
PMV BLD: 9.9 FL — SIGNIFICANT CHANGE UP (ref 7.4–10.4)
POTASSIUM SERPL-MCNC: 4.3 MMOL/L — SIGNIFICANT CHANGE UP (ref 3.5–5)
POTASSIUM SERPL-SCNC: 4.3 MMOL/L — SIGNIFICANT CHANGE UP (ref 3.5–5)
RBC # BLD: 2.89 M/UL — LOW (ref 4.2–5.4)
RBC # FLD: 16.8 % — HIGH (ref 11.5–14.5)
SODIUM SERPL-SCNC: 137 MMOL/L — SIGNIFICANT CHANGE UP (ref 135–146)
SPECIMEN SOURCE: SIGNIFICANT CHANGE UP
SPECIMEN SOURCE: SIGNIFICANT CHANGE UP
WBC # BLD: 14.57 K/UL — HIGH (ref 4.8–10.8)
WBC # FLD AUTO: 14.57 K/UL — HIGH (ref 4.8–10.8)

## 2024-04-27 PROCEDURE — 44970 LAPAROSCOPY APPENDECTOMY: CPT

## 2024-04-27 PROCEDURE — 88304 TISSUE EXAM BY PATHOLOGIST: CPT | Mod: 26

## 2024-04-27 RX ORDER — SODIUM CHLORIDE 9 MG/ML
1000 INJECTION, SOLUTION INTRAVENOUS
Refills: 0 | Status: DISCONTINUED | OUTPATIENT
Start: 2024-04-27 | End: 2024-04-27

## 2024-04-27 RX ORDER — ONDANSETRON 8 MG/1
4 TABLET, FILM COATED ORAL ONCE
Refills: 0 | Status: COMPLETED | OUTPATIENT
Start: 2024-04-27 | End: 2024-04-27

## 2024-04-27 RX ORDER — ACETAMINOPHEN 500 MG
575 TABLET ORAL ONCE
Refills: 0 | Status: DISCONTINUED | OUTPATIENT
Start: 2024-04-27 | End: 2024-04-27

## 2024-04-27 RX ORDER — HYDROMORPHONE HYDROCHLORIDE 2 MG/ML
0.25 INJECTION INTRAMUSCULAR; INTRAVENOUS; SUBCUTANEOUS
Refills: 0 | Status: DISCONTINUED | OUTPATIENT
Start: 2024-04-27 | End: 2024-04-27

## 2024-04-27 RX ADMIN — Medication 15 MILLIGRAM(S): at 17:16

## 2024-04-27 RX ADMIN — SIMVASTATIN 20 MILLIGRAM(S): 20 TABLET, FILM COATED ORAL at 21:50

## 2024-04-27 RX ADMIN — ONDANSETRON 4 MILLIGRAM(S): 8 TABLET, FILM COATED ORAL at 05:17

## 2024-04-27 RX ADMIN — Medication 100 MILLIGRAM(S): at 21:54

## 2024-04-27 RX ADMIN — HEPARIN SODIUM 5000 UNIT(S): 5000 INJECTION INTRAVENOUS; SUBCUTANEOUS at 06:25

## 2024-04-27 RX ADMIN — Medication 650 MILLIGRAM(S): at 17:47

## 2024-04-27 RX ADMIN — SODIUM CHLORIDE 125 MILLILITER(S): 9 INJECTION, SOLUTION INTRAVENOUS at 00:37

## 2024-04-27 RX ADMIN — CEFTRIAXONE 100 MILLIGRAM(S): 500 INJECTION, POWDER, FOR SOLUTION INTRAMUSCULAR; INTRAVENOUS at 22:53

## 2024-04-27 RX ADMIN — HEPARIN SODIUM 5000 UNIT(S): 5000 INJECTION INTRAVENOUS; SUBCUTANEOUS at 13:32

## 2024-04-27 RX ADMIN — Medication 650 MILLIGRAM(S): at 23:28

## 2024-04-27 RX ADMIN — SODIUM CHLORIDE 125 MILLILITER(S): 9 INJECTION, SOLUTION INTRAVENOUS at 12:02

## 2024-04-27 RX ADMIN — SODIUM CHLORIDE 125 MILLILITER(S): 9 INJECTION, SOLUTION INTRAVENOUS at 21:51

## 2024-04-27 RX ADMIN — Medication 100 MILLIGRAM(S): at 06:22

## 2024-04-27 RX ADMIN — Medication 15 MILLIGRAM(S): at 16:53

## 2024-04-27 RX ADMIN — Medication 650 MILLIGRAM(S): at 17:16

## 2024-04-27 RX ADMIN — Medication 100 MILLIGRAM(S): at 13:33

## 2024-04-27 RX ADMIN — CHLORHEXIDINE GLUCONATE 1 APPLICATION(S): 213 SOLUTION TOPICAL at 06:27

## 2024-04-27 RX ADMIN — HEPARIN SODIUM 5000 UNIT(S): 5000 INJECTION INTRAVENOUS; SUBCUTANEOUS at 21:50

## 2024-04-27 NOTE — PRE-ANESTHESIA EVALUATION ADULT - SPO2 (%)
98 Purse String (Intermediate) Text: Given the location of the defect and the characteristics of the surrounding skin a purse string intermediate closure was deemed most appropriate. Undermining was performed circumfirentially around the surgical defect. A purse string suture was then placed and tightened.

## 2024-04-27 NOTE — PRE-ANESTHESIA EVALUATION ADULT - NSANTHPMHFT_GEN_ALL_CORE
: 89-year-old female with past medical history of hyperlipidemia who presents to the ED with abdominal pain.  Reports that symptoms started a week ago; pain is in her general abdominal area, constant, and there is no alleviating or worsening factor.  Also endorses constipation since a week ago.  Denies fever, shortness of breath, chest pain, nausea, vomiting, hematuria, dysuria, melena, and hematochezia.    Line dancing 3x/wk.  Denies DM/MI/CVA/TIA/HTN.

## 2024-04-27 NOTE — BRIEF OPERATIVE NOTE - NSICDXBRIEFOPLAUNCH_GEN_ALL_CORE
TRANSITIONAL CARE MANAGEMENT - HOSPITAL DISCHARGE FOLLOW-UP    Contacted Ms. Chapman regarding follow-up for pneumonia after hospital discharge. She was discharged from the hospital on 05/06/18. Review of the After Visit Summary from the recent hospitalization indicates that the patient needs to follow up with PCP.    She feels that she is doing well at home.   Her diet concern is none. Overall, the patient is eating well.   Ambulation: improved uses a walker, gets tiered easy  Fever: is not present  Pain: none  Activities of Daily Living (global): Self-care   Patient states that she does have sufficient family support. She feels that she is able to ask for assistance when needed.     Additional patient/family concerns: scared of having low blood pressure.she bought a monitor for home. 05/08/18 Blood pressueres 0720 @ 128/63  And 100 @134/61. Requesting possible change of blood pressure medications.      Discharge medications were verified with the patient. She is fully compliant with the medication regimen prescribed at the time of discharge. She reports that she is not experiencing any medication side effects.    Upon discharge, the patient was to receive home healthcare services  and physical therapy. These services have not been initiated. These services have been scheduled and no further arrangements are needed at this time.     Patient has an appointment on 05/08/18 with Jonathan Gonzalez MD. Ms. Chapman was reminded about the importance of keeping this appointment.      <--- Click to Launch ICDx for PreOp, PostOp and Procedure

## 2024-04-27 NOTE — PRE-ANESTHESIA EVALUATION ADULT - NSANTHADDINFOFT_GEN_ALL_CORE
R/B/A discussed with patient.  Patient understands and agrees to plan.  All questions answered.  Consent obtained and witnessed with Welsh .

## 2024-04-27 NOTE — CHART NOTE - NSCHARTNOTEFT_GEN_A_CORE
General Surgery Post op Check    Pt seen and examined without complaints. Pain is controlled. Denies SOB/CP/N/V. Abdomen w/ mild incisional pain and CHUY ss. Incisions c/d/i. Tolerating diet.     Vital Signs Last 24 Hrs  T(C): 36.6 (27 Apr 2024 16:35), Max: 38 (26 Apr 2024 19:50)  T(F): 97.9 (27 Apr 2024 16:35), Max: 100.4 (26 Apr 2024 19:50)  HR: 90 (27 Apr 2024 16:35) (81 - 100)  BP: 106/54 (27 Apr 2024 16:35) (99/54 - 131/59)  BP(mean): --  RR: 18 (27 Apr 2024 16:35) (16 - 18)  SpO2: 98% (27 Apr 2024 16:35) (97% - 100%)    Parameters below as of 27 Apr 2024 16:35  Patient On (Oxygen Delivery Method): room air        I&O's Summary    26 Apr 2024 07:01  -  27 Apr 2024 07:00  --------------------------------------------------------  IN: 125 mL / OUT: 0 mL / NET: 125 mL    27 Apr 2024 07:01  -  27 Apr 2024 17:33  --------------------------------------------------------  IN: 1835 mL / OUT: 215 mL / NET: 1620 mL        Physical Exam  Gen: NAD   Pulm: No respiratory distress, no subcostal retractions  CV: RRR   Abd: Soft, dressings c/d/i, ND  Extremities:  FROM, warm and well perfused, equal bilateral muscle strength      A/P: 89y Female s/p lap appy  -DVT prophylaxis w/ SCD.  Encouraged OOB  - Monitor CHUY quality and quantity   -Strict I&O's  -Monitor CHUY drain output  -Analgesia and antiemetics as needed

## 2024-04-28 LAB
ANION GAP SERPL CALC-SCNC: 10 MMOL/L — SIGNIFICANT CHANGE UP (ref 7–14)
BASOPHILS # BLD AUTO: 0.05 K/UL — SIGNIFICANT CHANGE UP (ref 0–0.2)
BASOPHILS NFR BLD AUTO: 0.4 % — SIGNIFICANT CHANGE UP (ref 0–1)
BUN SERPL-MCNC: 14 MG/DL — SIGNIFICANT CHANGE UP (ref 10–20)
CALCIUM SERPL-MCNC: 6.9 MG/DL — LOW (ref 8.4–10.5)
CHLORIDE SERPL-SCNC: 104 MMOL/L — SIGNIFICANT CHANGE UP (ref 98–110)
CO2 SERPL-SCNC: 22 MMOL/L — SIGNIFICANT CHANGE UP (ref 17–32)
CREAT SERPL-MCNC: 0.6 MG/DL — LOW (ref 0.7–1.5)
EGFR: 86 ML/MIN/1.73M2 — SIGNIFICANT CHANGE UP
EOSINOPHIL # BLD AUTO: 0.15 K/UL — SIGNIFICANT CHANGE UP (ref 0–0.7)
EOSINOPHIL NFR BLD AUTO: 1.3 % — SIGNIFICANT CHANGE UP (ref 0–8)
GLUCOSE SERPL-MCNC: 66 MG/DL — LOW (ref 70–99)
HCT VFR BLD CALC: 29.6 % — LOW (ref 37–47)
HGB BLD-MCNC: 9.6 G/DL — LOW (ref 12–16)
IMM GRANULOCYTES NFR BLD AUTO: 1.2 % — HIGH (ref 0.1–0.3)
LYMPHOCYTES # BLD AUTO: 0.76 K/UL — LOW (ref 1.2–3.4)
LYMPHOCYTES # BLD AUTO: 6.6 % — LOW (ref 20.5–51.1)
MAGNESIUM SERPL-MCNC: 1.9 MG/DL — SIGNIFICANT CHANGE UP (ref 1.8–2.4)
MCHC RBC-ENTMCNC: 32.4 G/DL — SIGNIFICANT CHANGE UP (ref 32–37)
MCHC RBC-ENTMCNC: 33.7 PG — HIGH (ref 27–31)
MCV RBC AUTO: 103.9 FL — HIGH (ref 81–99)
MONOCYTES # BLD AUTO: 0.81 K/UL — HIGH (ref 0.1–0.6)
MONOCYTES NFR BLD AUTO: 7 % — SIGNIFICANT CHANGE UP (ref 1.7–9.3)
NEUTROPHILS # BLD AUTO: 9.69 K/UL — HIGH (ref 1.4–6.5)
NEUTROPHILS NFR BLD AUTO: 83.5 % — HIGH (ref 42.2–75.2)
NRBC # BLD: 0 /100 WBCS — SIGNIFICANT CHANGE UP (ref 0–0)
PHOSPHATE SERPL-MCNC: 2.1 MG/DL — SIGNIFICANT CHANGE UP (ref 2.1–4.9)
PLATELET # BLD AUTO: 549 K/UL — HIGH (ref 130–400)
PMV BLD: 9.9 FL — SIGNIFICANT CHANGE UP (ref 7.4–10.4)
POTASSIUM SERPL-MCNC: 3.9 MMOL/L — SIGNIFICANT CHANGE UP (ref 3.5–5)
POTASSIUM SERPL-SCNC: 3.9 MMOL/L — SIGNIFICANT CHANGE UP (ref 3.5–5)
RBC # BLD: 2.85 M/UL — LOW (ref 4.2–5.4)
RBC # FLD: 17 % — HIGH (ref 11.5–14.5)
SODIUM SERPL-SCNC: 136 MMOL/L — SIGNIFICANT CHANGE UP (ref 135–146)
WBC # BLD: 11.6 K/UL — HIGH (ref 4.8–10.8)
WBC # FLD AUTO: 11.6 K/UL — HIGH (ref 4.8–10.8)

## 2024-04-28 PROCEDURE — 99231 SBSQ HOSP IP/OBS SF/LOW 25: CPT | Mod: 24

## 2024-04-28 RX ORDER — SODIUM CHLORIDE 9 MG/ML
1000 INJECTION, SOLUTION INTRAVENOUS
Refills: 0 | Status: DISCONTINUED | OUTPATIENT
Start: 2024-04-28 | End: 2024-04-30

## 2024-04-28 RX ADMIN — Medication 650 MILLIGRAM(S): at 17:46

## 2024-04-28 RX ADMIN — HEPARIN SODIUM 5000 UNIT(S): 5000 INJECTION INTRAVENOUS; SUBCUTANEOUS at 13:55

## 2024-04-28 RX ADMIN — Medication 15 MILLIGRAM(S): at 13:55

## 2024-04-28 RX ADMIN — Medication 100 MILLIGRAM(S): at 21:28

## 2024-04-28 RX ADMIN — Medication 15 MILLIGRAM(S): at 16:16

## 2024-04-28 RX ADMIN — Medication 650 MILLIGRAM(S): at 16:16

## 2024-04-28 RX ADMIN — HEPARIN SODIUM 5000 UNIT(S): 5000 INJECTION INTRAVENOUS; SUBCUTANEOUS at 05:05

## 2024-04-28 RX ADMIN — CHLORHEXIDINE GLUCONATE 1 APPLICATION(S): 213 SOLUTION TOPICAL at 05:12

## 2024-04-28 RX ADMIN — SODIUM CHLORIDE 125 MILLILITER(S): 9 INJECTION, SOLUTION INTRAVENOUS at 05:05

## 2024-04-28 RX ADMIN — HEPARIN SODIUM 5000 UNIT(S): 5000 INJECTION INTRAVENOUS; SUBCUTANEOUS at 21:29

## 2024-04-28 RX ADMIN — CEFTRIAXONE 100 MILLIGRAM(S): 500 INJECTION, POWDER, FOR SOLUTION INTRAMUSCULAR; INTRAVENOUS at 23:41

## 2024-04-28 RX ADMIN — HYDROXYUREA 500 MILLIGRAM(S): 500 CAPSULE ORAL at 11:14

## 2024-04-28 RX ADMIN — SIMVASTATIN 20 MILLIGRAM(S): 20 TABLET, FILM COATED ORAL at 21:29

## 2024-04-28 RX ADMIN — Medication 100 MILLIGRAM(S): at 05:05

## 2024-04-28 RX ADMIN — Medication 650 MILLIGRAM(S): at 11:14

## 2024-04-28 RX ADMIN — Medication 100 MILLIGRAM(S): at 13:55

## 2024-04-28 RX ADMIN — SODIUM CHLORIDE 50 MILLILITER(S): 9 INJECTION, SOLUTION INTRAVENOUS at 11:14

## 2024-04-29 ENCOUNTER — TRANSCRIPTION ENCOUNTER (OUTPATIENT)
Age: 89
End: 2024-04-29

## 2024-04-29 LAB
-  AZTREONAM: SIGNIFICANT CHANGE UP
-  CEFEPIME: SIGNIFICANT CHANGE UP
-  CEFTAZIDIME: SIGNIFICANT CHANGE UP
-  CIPROFLOXACIN: SIGNIFICANT CHANGE UP
-  IMIPENEM: SIGNIFICANT CHANGE UP
-  LEVOFLOXACIN: SIGNIFICANT CHANGE UP
-  MEROPENEM: SIGNIFICANT CHANGE UP
-  PIPERACILLIN/TAZOBACTAM: SIGNIFICANT CHANGE UP
ANION GAP SERPL CALC-SCNC: 10 MMOL/L — SIGNIFICANT CHANGE UP (ref 7–14)
BASOPHILS # BLD AUTO: 0.06 K/UL — SIGNIFICANT CHANGE UP (ref 0–0.2)
BASOPHILS NFR BLD AUTO: 0.7 % — SIGNIFICANT CHANGE UP (ref 0–1)
BUN SERPL-MCNC: 12 MG/DL — SIGNIFICANT CHANGE UP (ref 10–20)
CALCIUM SERPL-MCNC: 7.3 MG/DL — LOW (ref 8.4–10.4)
CHLORIDE SERPL-SCNC: 106 MMOL/L — SIGNIFICANT CHANGE UP (ref 98–110)
CO2 SERPL-SCNC: 22 MMOL/L — SIGNIFICANT CHANGE UP (ref 17–32)
CREAT SERPL-MCNC: 0.6 MG/DL — LOW (ref 0.7–1.5)
EGFR: 86 ML/MIN/1.73M2 — SIGNIFICANT CHANGE UP
EOSINOPHIL # BLD AUTO: 0.25 K/UL — SIGNIFICANT CHANGE UP (ref 0–0.7)
EOSINOPHIL NFR BLD AUTO: 2.8 % — SIGNIFICANT CHANGE UP (ref 0–8)
GLUCOSE SERPL-MCNC: 128 MG/DL — HIGH (ref 70–99)
HCT VFR BLD CALC: 30.2 % — LOW (ref 37–47)
HGB BLD-MCNC: 10 G/DL — LOW (ref 12–16)
IMM GRANULOCYTES NFR BLD AUTO: 1.6 % — HIGH (ref 0.1–0.3)
IRON SATN MFR SERPL: 37 UG/DL — SIGNIFICANT CHANGE UP (ref 35–150)
IRON SATN MFR SERPL: 39 % — SIGNIFICANT CHANGE UP (ref 15–50)
LYMPHOCYTES # BLD AUTO: 0.88 K/UL — LOW (ref 1.2–3.4)
LYMPHOCYTES # BLD AUTO: 10 % — LOW (ref 20.5–51.1)
MAGNESIUM SERPL-MCNC: 1.9 MG/DL — SIGNIFICANT CHANGE UP (ref 1.8–2.4)
MCHC RBC-ENTMCNC: 33.1 G/DL — SIGNIFICANT CHANGE UP (ref 32–37)
MCHC RBC-ENTMCNC: 33.9 PG — HIGH (ref 27–31)
MCV RBC AUTO: 102.4 FL — HIGH (ref 81–99)
METHOD TYPE: SIGNIFICANT CHANGE UP
MONOCYTES # BLD AUTO: 0.72 K/UL — HIGH (ref 0.1–0.6)
MONOCYTES NFR BLD AUTO: 8.1 % — SIGNIFICANT CHANGE UP (ref 1.7–9.3)
NEUTROPHILS # BLD AUTO: 6.79 K/UL — HIGH (ref 1.4–6.5)
NEUTROPHILS NFR BLD AUTO: 76.8 % — HIGH (ref 42.2–75.2)
NRBC # BLD: 0 /100 WBCS — SIGNIFICANT CHANGE UP (ref 0–0)
PHOSPHATE SERPL-MCNC: 2.4 MG/DL — SIGNIFICANT CHANGE UP (ref 2.1–4.9)
PLATELET # BLD AUTO: 606 K/UL — HIGH (ref 130–400)
PMV BLD: 9.7 FL — SIGNIFICANT CHANGE UP (ref 7.4–10.4)
POTASSIUM SERPL-MCNC: 4.5 MMOL/L — SIGNIFICANT CHANGE UP (ref 3.5–5)
POTASSIUM SERPL-SCNC: 4.5 MMOL/L — SIGNIFICANT CHANGE UP (ref 3.5–5)
RBC # BLD: 2.95 M/UL — LOW (ref 4.2–5.4)
RBC # FLD: 17.1 % — HIGH (ref 11.5–14.5)
SODIUM SERPL-SCNC: 138 MMOL/L — SIGNIFICANT CHANGE UP (ref 135–146)
TIBC SERPL-MCNC: 94 UG/DL — LOW (ref 220–430)
UIBC SERPL-MCNC: 57 UG/DL — LOW (ref 110–370)
WBC # BLD: 8.84 K/UL — SIGNIFICANT CHANGE UP (ref 4.8–10.8)
WBC # FLD AUTO: 8.84 K/UL — SIGNIFICANT CHANGE UP (ref 4.8–10.8)

## 2024-04-29 PROCEDURE — 99024 POSTOP FOLLOW-UP VISIT: CPT

## 2024-04-29 RX ORDER — CALCIUM CARBONATE 500(1250)
2 TABLET ORAL EVERY 12 HOURS
Refills: 0 | Status: DISCONTINUED | OUTPATIENT
Start: 2024-04-29 | End: 2024-04-30

## 2024-04-29 RX ORDER — POTASSIUM PHOSPHATE, MONOBASIC POTASSIUM PHOSPHATE, DIBASIC 236; 224 MG/ML; MG/ML
30 INJECTION, SOLUTION INTRAVENOUS ONCE
Refills: 0 | Status: COMPLETED | OUTPATIENT
Start: 2024-04-29 | End: 2024-04-29

## 2024-04-29 RX ORDER — MAGNESIUM SULFATE 500 MG/ML
1 VIAL (ML) INJECTION ONCE
Refills: 0 | Status: COMPLETED | OUTPATIENT
Start: 2024-04-29 | End: 2024-04-29

## 2024-04-29 RX ORDER — PANTOPRAZOLE SODIUM 20 MG/1
40 TABLET, DELAYED RELEASE ORAL
Refills: 0 | Status: DISCONTINUED | OUTPATIENT
Start: 2024-04-29 | End: 2024-04-30

## 2024-04-29 RX ADMIN — CHLORHEXIDINE GLUCONATE 1 APPLICATION(S): 213 SOLUTION TOPICAL at 05:26

## 2024-04-29 RX ADMIN — Medication 650 MILLIGRAM(S): at 05:41

## 2024-04-29 RX ADMIN — Medication 100 GRAM(S): at 23:28

## 2024-04-29 RX ADMIN — Medication 650 MILLIGRAM(S): at 12:26

## 2024-04-29 RX ADMIN — Medication 100 MILLIGRAM(S): at 05:25

## 2024-04-29 RX ADMIN — Medication 2 TABLET(S): at 17:42

## 2024-04-29 RX ADMIN — PANTOPRAZOLE SODIUM 40 MILLIGRAM(S): 20 TABLET, DELAYED RELEASE ORAL at 17:42

## 2024-04-29 RX ADMIN — POTASSIUM PHOSPHATE, MONOBASIC POTASSIUM PHOSPHATE, DIBASIC 83.33 MILLIMOLE(S): 236; 224 INJECTION, SOLUTION INTRAVENOUS at 03:04

## 2024-04-29 RX ADMIN — SODIUM CHLORIDE 50 MILLILITER(S): 9 INJECTION, SOLUTION INTRAVENOUS at 23:26

## 2024-04-29 RX ADMIN — Medication 650 MILLIGRAM(S): at 12:13

## 2024-04-29 RX ADMIN — Medication 100 MILLIGRAM(S): at 21:58

## 2024-04-29 RX ADMIN — Medication 650 MILLIGRAM(S): at 05:26

## 2024-04-29 RX ADMIN — HEPARIN SODIUM 5000 UNIT(S): 5000 INJECTION INTRAVENOUS; SUBCUTANEOUS at 13:27

## 2024-04-29 RX ADMIN — CEFTRIAXONE 100 MILLIGRAM(S): 500 INJECTION, POWDER, FOR SOLUTION INTRAMUSCULAR; INTRAVENOUS at 22:48

## 2024-04-29 RX ADMIN — HEPARIN SODIUM 5000 UNIT(S): 5000 INJECTION INTRAVENOUS; SUBCUTANEOUS at 05:26

## 2024-04-29 RX ADMIN — HEPARIN SODIUM 5000 UNIT(S): 5000 INJECTION INTRAVENOUS; SUBCUTANEOUS at 21:58

## 2024-04-29 RX ADMIN — Medication 100 MILLIGRAM(S): at 13:28

## 2024-04-29 RX ADMIN — SODIUM CHLORIDE 50 MILLILITER(S): 9 INJECTION, SOLUTION INTRAVENOUS at 12:19

## 2024-04-29 RX ADMIN — SIMVASTATIN 20 MILLIGRAM(S): 20 TABLET, FILM COATED ORAL at 21:59

## 2024-04-29 RX ADMIN — Medication 650 MILLIGRAM(S): at 17:42

## 2024-04-29 NOTE — DISCHARGE NOTE NURSING/CASE MANAGEMENT/SOCIAL WORK - PATIENT PORTAL LINK FT
You can access the FollowMyHealth Patient Portal offered by [facility] by registering at the following website: [email or web address]. By joining [employer]’s FollowMyHealth portal, you will also be able to view your health information using other applications (apps) compatible with our system.

## 2024-04-29 NOTE — PHYSICAL THERAPY INITIAL EVALUATION ADULT - PERTINENT HX OF CURRENT PROBLEM, REHAB EVAL
89F with PMH of HLD and anemia and PSH of bilateral oophorectomy ~20 years ago. Patient had cold symptoms for the last 2 weeks and now presents to ED with one day of abdominal pain. No nausea, vomiting, or diarrhea. +constipation, Pain is on the right side of abdomen and has no alleviating factors. Patient speaks mostly Upper sorbian and daughter is bedside for translation.

## 2024-04-30 ENCOUNTER — TRANSCRIPTION ENCOUNTER (OUTPATIENT)
Age: 89
End: 2024-04-30

## 2024-04-30 VITALS
HEART RATE: 94 BPM | TEMPERATURE: 99 F | RESPIRATION RATE: 18 BRPM | DIASTOLIC BLOOD PRESSURE: 75 MMHG | OXYGEN SATURATION: 97 % | SYSTOLIC BLOOD PRESSURE: 147 MMHG

## 2024-04-30 LAB
-  AMOXICILLIN/CLAVULANIC ACID: SIGNIFICANT CHANGE UP
-  AMPICILLIN/SULBACTAM: SIGNIFICANT CHANGE UP
-  AMPICILLIN: SIGNIFICANT CHANGE UP
-  AMPICILLIN: SIGNIFICANT CHANGE UP
-  AZTREONAM: SIGNIFICANT CHANGE UP
-  CEFAZOLIN: SIGNIFICANT CHANGE UP
-  CEFEPIME: SIGNIFICANT CHANGE UP
-  CEFOXITIN: SIGNIFICANT CHANGE UP
-  CEFTRIAXONE: SIGNIFICANT CHANGE UP
-  CEFTRIAXONE: SIGNIFICANT CHANGE UP
-  CIPROFLOXACIN: SIGNIFICANT CHANGE UP
-  ERTAPENEM: SIGNIFICANT CHANGE UP
-  GENTAMICIN: SIGNIFICANT CHANGE UP
-  IMIPENEM: SIGNIFICANT CHANGE UP
-  LEVOFLOXACIN: SIGNIFICANT CHANGE UP
-  MEROPENEM: SIGNIFICANT CHANGE UP
-  PENICILLIN: SIGNIFICANT CHANGE UP
-  PIPERACILLIN/TAZOBACTAM: SIGNIFICANT CHANGE UP
-  TOBRAMYCIN: SIGNIFICANT CHANGE UP
-  TRIMETHOPRIM/SULFAMETHOXAZOLE: SIGNIFICANT CHANGE UP
-  VANCOMYCIN: SIGNIFICANT CHANGE UP
-  VANCOMYCIN: SIGNIFICANT CHANGE UP
CULTURE RESULTS: ABNORMAL
FOLATE SERPL-MCNC: 14.3 NG/ML — SIGNIFICANT CHANGE UP
FOLATE SERPL-MCNC: 9 NG/ML — SIGNIFICANT CHANGE UP
METHOD TYPE: SIGNIFICANT CHANGE UP
ORGANISM # SPEC MICROSCOPIC CNT: ABNORMAL
ORGANISM # SPEC MICROSCOPIC CNT: SIGNIFICANT CHANGE UP
SPECIMEN SOURCE: SIGNIFICANT CHANGE UP
SURGICAL PATHOLOGY STUDY: SIGNIFICANT CHANGE UP
VIT B12 SERPL-MCNC: >2000 PG/ML — HIGH (ref 232–1245)
VIT B12 SERPL-MCNC: >2000 PG/ML — HIGH (ref 232–1245)

## 2024-04-30 PROCEDURE — 99024 POSTOP FOLLOW-UP VISIT: CPT

## 2024-04-30 RX ORDER — PANTOPRAZOLE SODIUM 20 MG/1
1 TABLET, DELAYED RELEASE ORAL
Qty: 28 | Refills: 0
Start: 2024-04-30 | End: 2024-05-13

## 2024-04-30 RX ADMIN — HEPARIN SODIUM 5000 UNIT(S): 5000 INJECTION INTRAVENOUS; SUBCUTANEOUS at 06:05

## 2024-04-30 RX ADMIN — Medication 650 MILLIGRAM(S): at 11:47

## 2024-04-30 RX ADMIN — SODIUM CHLORIDE 50 MILLILITER(S): 9 INJECTION, SOLUTION INTRAVENOUS at 07:33

## 2024-04-30 RX ADMIN — Medication 100 MILLIGRAM(S): at 06:04

## 2024-04-30 RX ADMIN — PANTOPRAZOLE SODIUM 40 MILLIGRAM(S): 20 TABLET, DELAYED RELEASE ORAL at 06:04

## 2024-04-30 RX ADMIN — CHLORHEXIDINE GLUCONATE 1 APPLICATION(S): 213 SOLUTION TOPICAL at 06:12

## 2024-04-30 RX ADMIN — Medication 2 TABLET(S): at 06:04

## 2024-04-30 RX ADMIN — Medication 85 MILLIMOLE(S): at 00:47

## 2024-04-30 RX ADMIN — Medication 650 MILLIGRAM(S): at 11:22

## 2024-04-30 NOTE — PROGRESS NOTE ADULT - SUBJECTIVE AND OBJECTIVE BOX
GENERAL SURGERY PROGRESS NOTE     VERONICA GILMORE  [address] [place] [address] day :1d    24 hour events:  s/p lap appy  -g/-bm    PHYSICAL EXAM:  GENERAL: NAD, well-appearing  CHEST/LUNG: Clear to auscultation bilaterally  HEART: Regular rate and rhythm  ABDOMEN: Soft, Nontender, Nondistended; CHUY  EXTREMITIES:  No clubbing, cyanosis, or edema        T(F): 97.7 (04-27-24 @ 04:45), Max: 100.4 (04-26-24 @ 19:50)  HR: 83 (04-27-24 @ 05:45) (81 - 117)  BP: 122/60 (04-27-24 @ 05:45) (104/47 - 137/63)  ABP: --  ABP(mean): --  RR: 16 (04-27-24 @ 05:45) (16 - 18)  SpO2: 97% (04-27-24 @ 05:45) (97% - 100%)    IN'S / OUT's:      LABS  Labs:  CAPILLARY BLOOD GLUCOSE                              11.0   16.92 )-----------( 521      ( 26 Apr 2024 21:23 )             34.1       Auto Neutrophil %: 83.9 % (04-26-24 @ 21:23)  Auto Immature Granulocyte %: 0.6 % (04-26-24 @ 21:23)  Auto Neutrophil %: 81.3 % (04-26-24 @ 10:01)  Auto Immature Granulocyte %: 0.9 % (04-26-24 @ 10:01)    04-26    132<L>  |  98  |  13  ----------------------------<  127<H>  4.2   |  21  |  0.6<L>      Calcium: 7.6 mg/dL (04-26-24 @ 21:23)      LFTs:             6.7  | 0.4  | 25       ------------------[90      ( 26 Apr 2024 10:01 )  3.7  | x    | 10          Lipase:25     Amylase:x         Blood Gas Venous - Lactate: 0.9 mmol/L (04-26-24 @ 09:52)      Coags:     11.20  ----< 0.98    ( 26 Apr 2024 20:10 )     30.2                Urinalysis Basic - ( 26 Apr 2024 21:23 )    Color: x / Appearance: x / SG: x / pH: x  Gluc: 127 mg/dL / Ketone: x  / Bili: x / Urobili: x   Blood: x / Protein: x / Nitrite: x   Leuk Esterase: x / RBC: x / WBC x   Sq Epi: x / Non Sq Epi: x / Bacteria: x        Urinalysis with Rflx Culture (collected 26 Apr 2024 12:20)          RADIOLOGY & ADDITIONAL TESTS:        
GENERAL SURGERY PROGRESS NOTE     VERONICA GILMORE  89y  Female  Hospital day :2d     Procedure: Laparoscopic appendectomy      OVERNIGHT EVENTS:    - SASHA  - HDS and afebrile  - Pain controlled w/ slight incisional tenderness  - Denies N/V  - Tolerating diet     T(F): 97.8 (04-27-24 @ 23:52), Max: 98.1 (04-27-24 @ 14:50)  HR: 96 (04-27-24 @ 23:52) (81 - 97)  BP: 109/52 (04-27-24 @ 23:52) (98/55 - 131/59)  ABP: --  ABP(mean): --  RR: 18 (04-27-24 @ 23:52) (16 - 18)  SpO2: 95% (04-27-24 @ 21:27) (95% - 99%)    DIET/FLUIDS: lactated ringers. 1000 milliLiter(s) IV Continuous <Continuous>        GI proph:    AC/ proph: heparin   Injectable 5000 Unit(s) SubCutaneous every 8 hours    ABx: cefTRIAXone   IVPB      cefTRIAXone   IVPB 1000 milliGRAM(s) IV Intermittent every 24 hours  metroNIDAZOLE  IVPB      metroNIDAZOLE  IVPB 500 milliGRAM(s) IV Intermittent every 8 hours      PHYSICAL EXAM:  GENERAL: NAD   HEART: Regular rate and rhythm  ABDOMEN: Soft, slight incisional tenderness, Nondistended;   EXTREMITIES:  No clubbing, cyanosis, or edema      LABS  Labs:  CAPILLARY BLOOD GLUCOSE                              9.8    14.57 )-----------( 529      ( 27 Apr 2024 21:07 )             29.8         04-27    137  |  101  |  15  ----------------------------<  96  4.3   |  18  |  0.8      Calcium: 7.2 mg/dL (04-27-24 @ 21:07)      LFTs:             6.7  | 0.4  | 25       ------------------[90      ( 26 Apr 2024 10:01 )  3.7  | x    | 10          Lipase:25     Amylase:x         Blood Gas Venous - Lactate: 0.9 mmol/L (04-26-24 @ 09:52)      Coags:     11.20  ----< 0.98    ( 26 Apr 2024 20:10 )     30.2                Urinalysis Basic - ( 27 Apr 2024 21:07 )    Color: x / Appearance: x / SG: x / pH: x  Gluc: 96 mg/dL / Ketone: x  / Bili: x / Urobili: x   Blood: x / Protein: x / Nitrite: x   Leuk Esterase: x / RBC: x / WBC x   Sq Epi: x / Non Sq Epi: x / Bacteria: x        Culture - Acid Fast - Body Fluid w/Smear (collected 27 Apr 2024 03:02)  Source: .Body Fluid None    Culture - Body Fluid with Gram Stain (collected 27 Apr 2024 03:02)  Source: .Body Fluid peritoneal fluid  Gram Stain (27 Apr 2024 11:45):    Rare polymorphonuclear leukocytes seen per low power field    Few Gram Negative Rods seen per oil power field    Few Gram Positive Cocci in Pairs and Chains seen per oil power field    Urinalysis with Rflx Culture (collected 26 Apr 2024 12:20)          RADIOLOGY & ADDITIONAL TESTS:      A/P    VERONICA GILMORE is a 89yFemale s/p Laparoscopic appendectomy, perforated    PLAN:   - monitor vitals  - pain and nausea control   - monitor CHUY output  - CLD  - Abx  - pain control   - PT/OT evaluation      
GENERAL SURGERY PROGRESS NOTE    Patient: VERONICA GILMORE , 89y ([birth year])Female   MRN: [id number]  Location: [address]  Visit: 04-26-24 Inpatient  Date: 04-29-24 @ 02:59    Hospital Day #:  4  Post-Op Day #:  2    PROCEDURE/DX/INJURIES:   Acute perforated appendicitis   4/27 - Laparoscopic appendectomy     INTERVAL HX:   No acute events overnight.   CHUY drain w/ serosanguinous output.   Tolerating CLD, no nausea or vomiting. +G +BM   Gave 30 mmol KPhos (K 3.9, Phos 2.1).   Afebrile, HDS.     PAST MEDICAL & SURGICAL HISTORY:  ·	HLD (hyperlipidemia)  ·	Anemia    VITALS:   T(F): 97.3 (04-29-24 @ 00:25), Max: 98.6 (04-28-24 @ 20:56)  HR: 96 (04-29-24 @ 00:25)  BP: 131/61 (04-29-24 @ 00:25)  RR: 18 (04-29-24 @ 00:25)  SpO2: 96% (04-29-24 @ 00:25)    DIET:   Diet, Clear Liquid (04-27-24 @ 11:18) [Active]      FLUIDS: lactated ringers.: Solution, 1000 milliLiter(s) infuse at 50 mL/Hr  Provider's Contact #: [phone] (04-28-24 @ 10:43)      I & O's:    04-27-24 @ 07:01  -  04-28-24 @ 07:00  --------------------------------------------------------  IN:    IV PiggyBack: 100 mL    Lactated Ringers: 1500 mL    Oral Fluid: 360 mL  Total IN: 1960 mL    OUT:    Bulb (mL): 85 mL    Voided (mL): 150 mL  Total OUT: 235 mL    Total NET: 1725 mL      04-28-24 @ 07:01  -  04-29-24 @ 02:59  --------------------------------------------------------  IN:    IV PiggyBack: 100 mL    IV PiggyBack: 50 mL    Lactated Ringers: 375 mL    Lactated Ringers: 600 mL  Total IN: 1125 mL    OUT:    Bulb (mL): 15 mL  Total OUT: 15 mL    Total NET: 1110 mL    Bowel Movement: : [X] YES [] NO  Flatus: : [X] YES [] NO    PHYSICAL EXAM:  General: NAD, AAOx3, calm and cooperative  HEENT: EOMI, sclera non-icteric.  Heart: Regular rate  Lungs: Breathing comfortably  Abdomen:  Soft, nontender, nondistended. CHUY drain in place w/ serosanguinous output.   MSK/Extremities: Warm & well-perfused.   Skin: Warm, dry. No jaundice.   Incision/wound: healing well, dressings in place, clean, dry and intact    MEDICATIONS  (STANDING):  acetaminophen     Tablet .. 650 milliGRAM(s) Oral every 6 hours  cefTRIAXone   IVPB      cefTRIAXone   IVPB 1000 milliGRAM(s) IV Intermittent every 24 hours  chlorhexidine 2% Cloths 1 Application(s) Topical <User Schedule>  heparin   Injectable 5000 Unit(s) SubCutaneous every 8 hours  hydroxyurea 500 milliGRAM(s) Oral daily  lactated ringers. 1000 milliLiter(s) (50 mL/Hr) IV Continuous <Continuous>  metroNIDAZOLE  IVPB      metroNIDAZOLE  IVPB 500 milliGRAM(s) IV Intermittent every 8 hours  potassium phosphate IVPB 30 milliMole(s) IV Intermittent once  simvastatin 20 milliGRAM(s) Oral at bedtime    MEDICATIONS  (PRN):  ketorolac   Injectable 15 milliGRAM(s) IV Push every 6 hours PRN Moderate Pain (4 - 6)      DVT PROPHYLAXIS: heparin   Injectable 5000 Unit(s) SubCutaneous every 8 hours  GI PROPHYLAXIS: --  ANTICOAGULATION: --  ANTIBIOTICS:  cefTRIAXone   IVPB 1000 milliGRAM(s) + metroNIDAZOLE  IVPB 500 milliGRAM(s)      LAB/STUDIES:               9.6    11.60 )-----------( 549      ( 28 Apr 2024 22:29 )             29.6       Auto Neutrophil %: 83.5 % (04-28-24 @ 22:29)  Auto Immature Granulocyte %: 1.2 % (04-28-24 @ 22:29)    04-28    136  |  104  |  14  ----------------------------<  66<L>  3.9   |  22  |  0.6<L>      Calcium: 6.9 mg/dL (04-28-24 @ 22:29)        Urinalysis Basic - ( 28 Apr 2024 22:29 )    Color: x / Appearance: x / SG: x / pH: x  Gluc: 66 mg/dL / Ketone: x  / Bili: x / Urobili: x   Blood: x / Protein: x / Nitrite: x   Leuk Esterase: x / RBC: x / WBC x   Sq Epi: x / Non Sq Epi: x / Bacteria: x      MICROBIOLOGY:   Culture - Fungal, Body Fluid (collected 27 Apr 2024 03:02)  Source: .Body Fluid None  Preliminary Report (28 Apr 2024 06:41):    Testing in progress    Culture - Acid Fast - Body Fluid w/Smear (collected 27 Apr 2024 03:02)  Source: .Body Fluid None    Culture - Body Fluid with Gram Stain (collected 27 Apr 2024 03:02)  Source: .Body Fluid peritoneal fluid  Gram Stain (27 Apr 2024 11:45):    Rare polymorphonuclear leukocytes seen per low power field    Few Gram Negative Rods seen per oil power field    Few Gram Positive Cocci in Pairs and Chains seen per oil power field  Preliminary Report (28 Apr 2024 11:54):    Moderate Pseudomonas aeruginosa    Few Streptococcus anginosus    Culture - Urine (collected 26 Apr 2024 12:20)  Source: Clean Catch None  Final Report (28 Apr 2024 17:37):    <10,000 CFU/mL Normal Urogenital Sury    Urinalysis with Rflx Culture (collected 26 Apr 2024 12:20)      IMAGING: No new imaging obtained in the past 24h       ACCESS/ DEVICES:  [X] Peripheral IV  [ ] Central Venous Line	[ ] R	[ ] L	[ ] IJ	[ ] Fem	[ ] SC	Placed:   [ ] Arterial Line		[ ] R	[ ] L	[ ] Fem	[ ] Rad	[ ] Ax	Placed:   [ ] PICC:					[ ] Mediport  [ ] Urinary Catheter,  Date Placed:   [ ] Chest tube: [ ] Right, [ ] Left  [X] CHUY/Luis Alberto Drains
GENERAL SURGERY PROGRESS NOTE     VERONICA GILMORE  [address] [place] [address] day :4d    24 hour events:  advanced to regular diet, tolerating  passing gas  having BMs  CHUY     PHYSICAL EXAM:  GENERAL: NAD, well-appearing  CHEST/LUNG: Clear to auscultation bilaterally  HEART: Regular rate and rhythm  ABDOMEN: Soft, Nontender, Nondistended; CHUY  EXTREMITIES:  No clubbing, cyanosis, or edema        T(F): 97.8 (04-30-24 @ 04:58), Max: 98.3 (04-29-24 @ 08:27)  HR: 85 (04-30-24 @ 04:58) (85 - 97)  BP: 136/65 (04-30-24 @ 04:58) (115/60 - 139/-)  ABP: --  ABP(mean): --  RR: 18 (04-30-24 @ 04:58) (18 - 18)  SpO2: 96% (04-30-24 @ 04:58) (96% - 97%)    IN'S / OUT's:    04-28-24 @ 07:01  -  04-29-24 @ 07:00  --------------------------------------------------------  IN:    IV PiggyBack: 50 mL    IV PiggyBack: 200 mL    IV PiggyBack: 416.5 mL    Lactated Ringers: 375 mL    Lactated Ringers: 900 mL  Total IN: 1941.5 mL    OUT:    Bulb (mL): 25 mL    Voided (mL): 250 mL  Total OUT: 275 mL    Total NET: 1666.5 mL      04-29-24 @ 07:01  -  04-30-24 @ 05:46  --------------------------------------------------------  IN:    Lactated Ringers: 300 mL    Oral Fluid: 510 mL  Total IN: 810 mL    OUT:    Bulb (mL): 35 mL    Voided (mL): 300 mL  Total OUT: 335 mL    Total NET: 475 mL          LABS  Labs:  CAPILLARY BLOOD GLUCOSE                              10.0   8.84  )-----------( 606      ( 29 Apr 2024 20:00 )             30.2       Auto Neutrophil %: 76.8 % (04-29-24 @ 20:00)  Auto Immature Granulocyte %: 1.6 % (04-29-24 @ 20:00)    04-29    138  |  106  |  12  ----------------------------<  128<H>  4.5   |  22  |  0.6<L>      Calcium: 7.3 mg/dL (04-29-24 @ 20:00)      LFTs:         Coags:            Urinalysis Basic - ( 29 Apr 2024 20:00 )    Color: x / Appearance: x / SG: x / pH: x  Gluc: 128 mg/dL / Ketone: x  / Bili: x / Urobili: x   Blood: x / Protein: x / Nitrite: x   Leuk Esterase: x / RBC: x / WBC x   Sq Epi: x / Non Sq Epi: x / Bacteria: x            RADIOLOGY & ADDITIONAL TESTS:

## 2024-04-30 NOTE — DISCHARGE NOTE PROVIDER - NSDCCPTREATMENT_GEN_ALL_CORE_FT
PRINCIPAL PROCEDURE  Procedure: Laparoscopic appendectomy  Findings and Treatment: Follow up with Dr. Anderson in 1 week.   Take over the counter Tylenol for pain. Follow instructions on the label.  Protonix has been sent to [facility] at [facility], take twice a day for 2 weeks.     PRINCIPAL PROCEDURE  Procedure: Laparoscopic appendectomy  Findings and Treatment: Follow up with Dr. Anderson in 1 week.   Take over the counter Tylenol for pain. Follow instructions on the label.  Protonix has been sent to [facility] pharmacy at [facility], take twice a day for 2 weeks.  Augmentin has been sent to [facility] pharmacy at [facility], take twice a day for 1 week.

## 2024-04-30 NOTE — DISCHARGE NOTE PROVIDER - NSDCCPCAREPLAN_GEN_ALL_CORE_FT
PRINCIPAL DISCHARGE DIAGNOSIS  Diagnosis: Ruptured appendix  Assessment and Plan of Treatment: Managed with appendectomy and antibiotics.  Follow up with Dr. Anderson in 1 week.

## 2024-04-30 NOTE — PROGRESS NOTE ADULT - ASSESSMENT
A/P:  VERONICA GILMORE is a 89yFemale s/p Laparoscopic appendectomy, perforated    PLAN:   - monitor CHUY output  - CLD  - Abx  - pain control       TAP (Trauma, Acute care, Pediatrics) Spectra [id number]  
89y F w/ PMHx of asthma and HLD, s/p lap appy on 4/27 for acute perforated appendicitis. Doing well on POD#2.      #Acute perforated appendicitis   S/P Laparoscopic appendectomy (x1 CHUY drain left in place) on 4/27/24   - Monitor CHUY drain output   - Will plan to advance diet later today  - Daily PM labs w/ repletion of electrolytes as needed   - Continue Ceftriaxone/Flagyl (plan for 4 days of abx post-op)   - Monitor bowel function  - Pain control PRN  - Monitor for nausea/vomiting    - Needs PT evaluation  - Encourage OOBTC and ambulation   -----------------------------------  Surgery - TAP  [phone] 
  A/P:  VERONICA GILMORE is a 89yFemale s/p lap appy  PLAN:   dispo  pain control   abx  follow up with Dr. Anderson in 1 week      TAP (Trauma, Acute care, Pediatrics) Spectra [id number]

## 2024-04-30 NOTE — PROVIDER CONTACT NOTE (OTHER) - SITUATION
Pt had a dark tarry BM at this time
patient tachycardic at 108, BP is 148/67
patient had another bowel movement that was black

## 2024-04-30 NOTE — DISCHARGE NOTE PROVIDER - NSDCMRMEDTOKEN_GEN_ALL_CORE_FT
hydroxyurea 500 mg oral capsule: 15 mg/kg orally once a day  simvastatin 20 mg oral tablet: 1 tab(s) orally once a day (at bedtime)   hydroxyurea 500 mg oral capsule: 15 mg/kg orally once a day  Protonix 40 mg oral delayed release tablet: 1 tab(s) orally 2 times a day  simvastatin 20 mg oral tablet: 1 tab(s) orally once a day (at bedtime)   amoxicillin-clavulanate 875 mg-125 mg oral tablet: 875 milligram(s) orally 2 times a day  hydroxyurea 500 mg oral capsule: 15 mg/kg orally once a day  Protonix 40 mg oral delayed release tablet: 1 tab(s) orally 2 times a day  simvastatin 20 mg oral tablet: 1 tab(s) orally once a day (at bedtime)   amoxicillin-clavulanate 875 mg-125 mg oral tablet: 1 tab(s) orally every 12 hours  hydroxyurea 500 mg oral capsule: 15 mg/kg orally once a day  Protonix 40 mg oral delayed release tablet: 1 tab(s) orally every 12 hours  simvastatin 20 mg oral tablet: 1 tab(s) orally once a day (at bedtime)

## 2024-04-30 NOTE — DISCHARGE NOTE PROVIDER - NSDCFUADDINST_GEN_ALL_CORE_FT
SURGERY DISCHARGE INSTRUCTIONS    FOLLOW-UP - with Dr. Anderson in 1 week. Call the office to make an appointment or if you have any questions/concerns.    DIET - regular.     ACTIVITY- No heavy lifting for 4-6 wks over 10-20 lbs. Walking is encouraged. No running or swimming. No driving while taking pain medication.    WOUNDCARE - Some drainage from your incisions or drain sites is normal. If you have drainage from an open incision or drain site- cover loosely with sterile gauze and tape and change daily. If you have clear outer plastic dressing with gauze- remove 3 days after surgery and leave little white bandage strips underneath it on for 7 days. If you have no bandages but have purple glue over your incisions instead, this will come off with time. May shower 24 hours after surgery but no submerging wound under water for 2 weeks (tub bathing). Pat area dry when wet, keep clean and dry. Do not apply powders or lotion to wound area.     DRAINS- if you have drains, number or label each drain, for example drain 1 and drain 2. measure the output from each of them as needed when they become full or when you remember, for example once in the morning and once at night. Write down the total amount from each drain daily and bring this to your appointment.    PAIN MEDS - Take over the counter Tylenol for pain. Follow instructions on the label.    ANTIBIOTICS- Take antibiotics as directed if prescribed.     OTHER MEDS - If you have any questions about your other regular home medications please call your primary care physician or the physician who prescribed those medications to you.     If you develop fever, dizziness, chest pain, trouble breathing, nausea, vomiting, increasing abdominal pain, inability to pass bowel movements, redness/pain/discharge from incisions. Please call the office or go to the emergency room immediately. SURGERY DISCHARGE INSTRUCTIONS    FOLLOW-UP - with Dr. Anderson in 1-2 weeks. Call the office to make an appointment or if you have any questions/concerns.    DIET - regular.     ACTIVITY- No heavy lifting for 4-6 wks over 10-20 lbs. Walking is encouraged. No running or swimming. No driving while taking pain medication.    WOUNDCARE - Some drainage from your incisions or drain sites is normal. If you have drainage from an open incision or drain site- cover loosely with sterile gauze and tape and change daily. If you have clear outer plastic dressing with gauze- remove 3 days after surgery and leave little white bandage strips underneath it on for 7 days. If you have no bandages but have purple glue over your incisions instead, this will come off with time. May shower 24 hours after surgery but no submerging wound under water for 2 weeks (tub bathing). Pat area dry when wet, keep clean and dry. Do not apply powders or lotion to wound area.     DRAINS- if you have drains, number or label each drain, for example drain 1 and drain 2. measure the output from each of them as needed when they become full or when you remember, for example once in the morning and once at night. Write down the total amount from each drain daily and bring this to your appointment.    PAIN MEDS - Take over the counter Tylenol for pain. Follow instructions on the label.    ANTIBIOTICS- Take antibiotics as directed if prescribed.     OTHER MEDS - If you have any questions about your other regular home medications please call your primary care physician or the physician who prescribed those medications to you.     If you develop fever, dizziness, chest pain, trouble breathing, nausea, vomiting, increasing abdominal pain, inability to pass bowel movements, redness/pain/discharge from incisions. Please call the office or go to the emergency room immediately.

## 2024-04-30 NOTE — DISCHARGE NOTE PROVIDER - CARE PROVIDER_API CALL
Kang Anderson  Surgical Critical Care  [address] [place] [address], [address] [place] [address]  Phone: [phone]  Fax: [phone]  Follow Up Time: 1 week

## 2024-04-30 NOTE — DISCHARGE NOTE PROVIDER - HOSPITAL COURSE
89F with PMH of HLD and anemia and PSH of bilateral oophorectomy ~20 years ago. Patient  presents to ED with one day of abdominal pain. No nausea, vomiting, or diarrhea. +constipation, Pain is on the right side of abdomen and has no alleviating factors. WCC 15 and CTAP concerning for acute perforated appendicitis.    CT scan: Dilated acute appendicitis with findings concerning for perforation. Terminal ileitis and cecitis are also noted likely reactive.  Diagnosed with acute appendicitis.     Managed with Rocephin, Flagyl, and taken to the OR for a laparoscopic appendectomy.   Operative findings: Acute perforated appendicitis with an abscess. 1 CHUY drain was left.    Post operatively, the patient did well, tolerated their diet, had gas and bowel movements, voiding freely.    Of note, patient is ready, able, and willing to be discharged.     Patient to follow up with Dr. Anderson in 1 week. 89F with PMH of HLD and anemia and PSH of bilateral oophorectomy ~20 years ago. Patient  presents to ED with one day of abdominal pain. No nausea, vomiting, or diarrhea. +constipation, Pain is on the right side of abdomen and has no alleviating factors. WCC 15 and CTAP concerning for acute perforated appendicitis.    CT scan: Dilated acute appendicitis with findings concerning for perforation. Terminal ileitis and cecitis are also noted likely reactive.  Diagnosed with acute appendicitis.     Managed with Rocephin, Flagyl, and taken to the OR for a laparoscopic appendectomy.   Operative findings: Acute perforated appendicitis with an abscess. 1 CHUY drain was left.    Post operatively, the patient did well, tolerated their diet, had gas and bowel movements, voiding freely.    Of note, patient is ready, able, and willing to be discharged with PO antibiotics and protonix. Follow up outpatient with Dr. Anderson in 2 weeks.

## 2024-04-30 NOTE — PROGRESS NOTE ADULT - ATTENDING COMMENTS
ACS Attending  Note Attestation    Patient is examined and evaluated at the bedside with the residents/PAs. Treatment plan discussed with the team, nurses, and consulting physicians and consulting teams. Medications, radiological studies and all other relevant studies reviewed.     VERONICA GILMORE Patient is a 89y old  Female who presents with a chief complaint of APPENDICITIS perforated with gangrene and generalized peritonitis. S/P laparoscopic appendectomy        Vital Signs Last 24 Hrs  T(C): 36.6 (29 Apr 2024 16:25), Max: 36.9 (29 Apr 2024 05:15)  T(F): 97.9 (29 Apr 2024 16:25), Max: 98.4 (29 Apr 2024 05:15)  HR: 94 (29 Apr 2024 16:25) (92 - 108)  BP: 117/56 (29 Apr 2024 16:25) (115/60 - 148/67)  BP(mean): --  RR: 18 (29 Apr 2024 16:25) (18 - 18)  SpO2: 96% (29 Apr 2024 16:25) (96% - 97%)                            10.0   8.84  )-----------( 606      ( 29 Apr 2024 20:00 )             30.2     04-29    138  |  106  |  12  ----------------------------<  128<H>  4.5   |  22  |  0.6<L>    Ca    7.3<L>      29 Apr 2024 20:00  Phos  2.4     04-29  Mg     1.9     04-29    CHUY output 15ml/24 hrs purulent    Diagnosis: Perforated appendicitis with generalized peritonitis                  S/P Laparoscopic appendectomy    Plan:	  - supportive care  - GI/DVT prophylaxis  - pain management  - advance diet to full liquids  - incentive spirometer    - continue IV ABx  - follow up consults  - CHUY drain teaching (patient most likely will be discharge with the drain)  - repeat studies as needed  - replace electrolytes  - case management evaluation     Genna Clayton MD, FACS  Trauma/ACS/Surgical Critical Care Attending
Trauma/Acute Care Surgery Attending Note Attestation    Patient was seen and examined bedside.  I reviewed the resident/PA note and agreed with above assessment and plan with following additions and corrections.    Passed BM this morning. Pain well-controlled. No nausea, vomiting.    T(C): 36.6 (04-28-24 @ 12:39), Max: 36.6 (04-27-24 @ 23:52)  HR: 90 (04-28-24 @ 12:39) (90 - 96)  BP: 108/53 (04-28-24 @ 12:39) (98/55 - 109/52)  RR: 18 (04-28-24 @ 12:39) (18 - 18)  SpO2: 97% (04-28-24 @ 12:39) (95% - 99%)      04-27-24 @ 07:01  -  04-28-24 @ 07:00  --------------------------------------------------------  IN:    IV PiggyBack: 100 mL    Lactated Ringers: 1500 mL    Oral Fluid: 360 mL  Total IN: 1960 mL    OUT:    Bulb (mL): 85 mL    Voided (mL): 150 mL  Total OUT: 235 mL    Total NET: 1725 mL      04-28-24 @ 07:01  -  04-28-24 @ 17:40  --------------------------------------------------------  IN:    Lactated Ringers: 375 mL    Lactated Ringers: 250 mL  Total IN: 625 mL    OUT:  Total OUT: 0 mL    Total NET: 625 mL      I independently performed a medically appropriate exam. The exam was notable for mild tenderness to palpation RLQ. CHUY drain with murky serosanguinous fluid.                          9.8    14.57 )-----------( 529      ( 27 Apr 2024 21:07 )             29.8     04-27    137  |  101  |  15  ----------------------------<  96  4.3   |  18  |  0.8    Ca 7.2<L>; Mg 1.9; Phos 3.6          Assessment/Plan:  89y Female with PMH HLD who presented with acute perforated appendicitis with abscess  - Acute perforated appendicitis with abscess - s/p lap appendectomy, clear liquid diet  - Peritonitis - continue ceftriaxone/flagyl, plan for 4 days of abx post op, multimodal pain control  - HLD - continue statin    Alfred White MD  Trauma/Acute Care Surgery/Surgical Critical Care Attending
ACS Attending  Note Attestation    Patient is examined and evaluated at the bedside with the residents/PAs. Treatment plan discussed with the team, nurses, and consulting physicians and consulting teams. Medications, radiological studies and all other relevant studies reviewed.     VERONICA GILMORE Patient is a 89y old  Female who presents with a chief complaint of APPENDICITIS perforated with gangrene and generalized peritonitis. S/P laparoscopic appendectomy        Vital Signs Last 24 Hrs  T(C): 37.4 (30 Apr 2024 13:22), Max: 37.4 (30 Apr 2024 13:22)  T(F): 99.3 (30 Apr 2024 13:22), Max: 99.3 (30 Apr 2024 13:22)  HR: 94 (30 Apr 2024 13:22) (85 - 94)  BP: 147/75 (30 Apr 2024 13:22) (136/65 - 150/77)  BP(mean): --  RR: 18 (30 Apr 2024 13:22) (18 - 18)  SpO2: 97% (30 Apr 2024 13:22) (96% - 97%)    Parameters below as of 30 Apr 2024 13:22  Patient On (Oxygen Delivery Method): room air                            10.0   8.84  )-----------( 606      ( 29 Apr 2024 20:00 )             30.2   04-29    138  |  106  |  12  ----------------------------<  128<H>  4.5   |  22  |  0.6<L>    Ca    7.3<L>      29 Apr 2024 20:00  Phos  2.4     04-29  Mg     1.9     04-29      Diagnosis: Perforated appendicitis with generalized peritonitis                  S/P Laparoscopic appendectomy                  Hyperlipidemia      Plan:	  - supportive care  - GI/DVT prophylaxis  - continue Simvastatin   - pain management  - advance diet to full liquids  - incentive spirometer    - continue IV ABx until discharge  - follow up consults  - follow up cultures  - CHUY drain teaching (patient most likely will be discharge with the drain)  - repeat studies as needed  - replace electrolytes  - case management evaluation   - D/C planning on PO ABx    Genna Clayton MD, FACS  Trauma/ACS/Surgical Critical Care Attending

## 2024-04-30 NOTE — PROVIDER CONTACT NOTE (OTHER) - ACTION/TREATMENT ORDERED:
MD Guthrie notified during am rounds
MD made aware - no intervention at this time
provider came to bedside to assess BM, discussing with team, bedside RN Ana made aware

## 2024-05-04 ENCOUNTER — INPATIENT (INPATIENT)
Facility: HOSPITAL | Age: 89
LOS: 0 days | Discharge: HOME | DRG: 310 | End: 2024-05-05
Attending: INTERNAL MEDICINE | Admitting: INTERNAL MEDICINE
Payer: MEDICARE

## 2024-05-04 VITALS
RESPIRATION RATE: 20 BRPM | HEART RATE: 168 BPM | TEMPERATURE: 98 F | OXYGEN SATURATION: 97 % | WEIGHT: 98.11 LBS | DIASTOLIC BLOOD PRESSURE: 96 MMHG | SYSTOLIC BLOOD PRESSURE: 156 MMHG | HEIGHT: 62 IN

## 2024-05-04 DIAGNOSIS — I48.91 UNSPECIFIED ATRIAL FIBRILLATION: ICD-10-CM

## 2024-05-04 PROBLEM — D64.9 ANEMIA, UNSPECIFIED: Chronic | Status: ACTIVE | Noted: 2024-04-26

## 2024-05-04 PROBLEM — E78.5 HYPERLIPIDEMIA, UNSPECIFIED: Chronic | Status: ACTIVE | Noted: 2024-04-26

## 2024-05-04 LAB
ALBUMIN SERPL ELPH-MCNC: 3.7 G/DL — SIGNIFICANT CHANGE UP (ref 3.5–5.2)
ALP SERPL-CCNC: 86 U/L — SIGNIFICANT CHANGE UP (ref 30–115)
ALT FLD-CCNC: 27 U/L — SIGNIFICANT CHANGE UP (ref 0–41)
ANION GAP SERPL CALC-SCNC: 13 MMOL/L — SIGNIFICANT CHANGE UP (ref 7–14)
APTT BLD: 39.2 SEC — SIGNIFICANT CHANGE UP (ref 27–39.2)
AST SERPL-CCNC: 83 U/L — HIGH (ref 0–41)
BASOPHILS # BLD AUTO: 0.09 K/UL — SIGNIFICANT CHANGE UP (ref 0–0.2)
BASOPHILS NFR BLD AUTO: 1 % — SIGNIFICANT CHANGE UP (ref 0–1)
BILIRUB SERPL-MCNC: 0.3 MG/DL — SIGNIFICANT CHANGE UP (ref 0.2–1.2)
BUN SERPL-MCNC: 6 MG/DL — LOW (ref 10–20)
CALCIUM SERPL-MCNC: 8.5 MG/DL — SIGNIFICANT CHANGE UP (ref 8.4–10.5)
CHLORIDE SERPL-SCNC: 100 MMOL/L — SIGNIFICANT CHANGE UP (ref 98–110)
CO2 SERPL-SCNC: 25 MMOL/L — SIGNIFICANT CHANGE UP (ref 17–32)
CREAT SERPL-MCNC: 0.6 MG/DL — LOW (ref 0.7–1.5)
D DIMER BLD IA.RAPID-MCNC: 763 NG/ML DDU — HIGH
EGFR: 86 ML/MIN/1.73M2 — SIGNIFICANT CHANGE UP
EOSINOPHIL # BLD AUTO: 0.09 K/UL — SIGNIFICANT CHANGE UP (ref 0–0.7)
EOSINOPHIL NFR BLD AUTO: 1 % — SIGNIFICANT CHANGE UP (ref 0–8)
GLUCOSE SERPL-MCNC: 176 MG/DL — HIGH (ref 70–99)
HCT VFR BLD CALC: 38 % — SIGNIFICANT CHANGE UP (ref 37–47)
HGB BLD-MCNC: 12.4 G/DL — SIGNIFICANT CHANGE UP (ref 12–16)
IMM GRANULOCYTES NFR BLD AUTO: 1.8 % — HIGH (ref 0.1–0.3)
INR BLD: 0.88 RATIO — SIGNIFICANT CHANGE UP (ref 0.65–1.3)
LYMPHOCYTES # BLD AUTO: 1.69 K/UL — SIGNIFICANT CHANGE UP (ref 1.2–3.4)
LYMPHOCYTES # BLD AUTO: 18.6 % — LOW (ref 20.5–51.1)
MAGNESIUM SERPL-MCNC: 2.3 MG/DL — SIGNIFICANT CHANGE UP (ref 1.8–2.4)
MCHC RBC-ENTMCNC: 32.6 G/DL — SIGNIFICANT CHANGE UP (ref 32–37)
MCHC RBC-ENTMCNC: 34.8 PG — HIGH (ref 27–31)
MCV RBC AUTO: 106.7 FL — HIGH (ref 81–99)
MONOCYTES # BLD AUTO: 1.06 K/UL — HIGH (ref 0.1–0.6)
MONOCYTES NFR BLD AUTO: 11.6 % — HIGH (ref 1.7–9.3)
NEUTROPHILS # BLD AUTO: 6.02 K/UL — SIGNIFICANT CHANGE UP (ref 1.4–6.5)
NEUTROPHILS NFR BLD AUTO: 66 % — SIGNIFICANT CHANGE UP (ref 42.2–75.2)
NRBC # BLD: 0 /100 WBCS — SIGNIFICANT CHANGE UP (ref 0–0)
NT-PROBNP SERPL-SCNC: 1045 PG/ML — HIGH (ref 0–300)
PLATELET # BLD AUTO: 645 K/UL — HIGH (ref 130–400)
PMV BLD: 10.1 FL — SIGNIFICANT CHANGE UP (ref 7.4–10.4)
POTASSIUM SERPL-MCNC: SIGNIFICANT CHANGE UP MMOL/L (ref 3.5–5)
POTASSIUM SERPL-SCNC: SIGNIFICANT CHANGE UP MMOL/L (ref 3.5–5)
PROT SERPL-MCNC: 7.1 G/DL — SIGNIFICANT CHANGE UP (ref 6–8)
PROTHROM AB SERPL-ACNC: 10 SEC — SIGNIFICANT CHANGE UP (ref 9.95–12.87)
RBC # BLD: 3.56 M/UL — LOW (ref 4.2–5.4)
RBC # FLD: 18.1 % — HIGH (ref 11.5–14.5)
SODIUM SERPL-SCNC: 138 MMOL/L — SIGNIFICANT CHANGE UP (ref 135–146)
TROPONIN T, HIGH SENSITIVITY RESULT: 13 NG/L — SIGNIFICANT CHANGE UP (ref 6–13)
WBC # BLD: 9.11 K/UL — SIGNIFICANT CHANGE UP (ref 4.8–10.8)
WBC # FLD AUTO: 9.11 K/UL — SIGNIFICANT CHANGE UP (ref 4.8–10.8)

## 2024-05-04 PROCEDURE — 80061 LIPID PANEL: CPT

## 2024-05-04 PROCEDURE — 99291 CRITICAL CARE FIRST HOUR: CPT

## 2024-05-04 PROCEDURE — 85025 COMPLETE CBC W/AUTO DIFF WBC: CPT

## 2024-05-04 PROCEDURE — 83735 ASSAY OF MAGNESIUM: CPT

## 2024-05-04 PROCEDURE — 84443 ASSAY THYROID STIM HORMONE: CPT

## 2024-05-04 PROCEDURE — 83036 HEMOGLOBIN GLYCOSYLATED A1C: CPT

## 2024-05-04 PROCEDURE — 71045 X-RAY EXAM CHEST 1 VIEW: CPT | Mod: 26

## 2024-05-04 PROCEDURE — 93010 ELECTROCARDIOGRAM REPORT: CPT

## 2024-05-04 PROCEDURE — 99223 1ST HOSP IP/OBS HIGH 75: CPT

## 2024-05-04 PROCEDURE — 80053 COMPREHEN METABOLIC PANEL: CPT

## 2024-05-04 PROCEDURE — 71275 CT ANGIOGRAPHY CHEST: CPT | Mod: 26,MC

## 2024-05-04 PROCEDURE — 36415 COLL VENOUS BLD VENIPUNCTURE: CPT

## 2024-05-04 RX ORDER — SODIUM CHLORIDE 9 MG/ML
500 INJECTION INTRAMUSCULAR; INTRAVENOUS; SUBCUTANEOUS ONCE
Refills: 0 | Status: COMPLETED | OUTPATIENT
Start: 2024-05-04 | End: 2024-05-04

## 2024-05-04 RX ORDER — APIXABAN 2.5 MG/1
5 TABLET, FILM COATED ORAL ONCE
Refills: 0 | Status: COMPLETED | OUTPATIENT
Start: 2024-05-04 | End: 2024-05-04

## 2024-05-04 RX ORDER — APIXABAN 2.5 MG/1
2.5 TABLET, FILM COATED ORAL EVERY 12 HOURS
Refills: 0 | Status: DISCONTINUED | OUTPATIENT
Start: 2024-05-04 | End: 2024-05-05

## 2024-05-04 RX ORDER — ASPIRIN/CALCIUM CARB/MAGNESIUM 324 MG
81 TABLET ORAL DAILY
Refills: 0 | Status: DISCONTINUED | OUTPATIENT
Start: 2024-05-04 | End: 2024-05-05

## 2024-05-04 RX ORDER — HYDROXYUREA 500 MG/1
15 CAPSULE ORAL
Refills: 0 | DISCHARGE

## 2024-05-04 RX ORDER — DILTIAZEM HCL 120 MG
5 CAPSULE, EXT RELEASE 24 HR ORAL
Qty: 125 | Refills: 0 | Status: DISCONTINUED | OUTPATIENT
Start: 2024-05-04 | End: 2024-05-05

## 2024-05-04 RX ORDER — ACETAMINOPHEN 500 MG
650 TABLET ORAL EVERY 6 HOURS
Refills: 0 | Status: DISCONTINUED | OUTPATIENT
Start: 2024-05-04 | End: 2024-05-05

## 2024-05-04 RX ORDER — METOPROLOL TARTRATE 50 MG
25 TABLET ORAL THREE TIMES A DAY
Refills: 0 | Status: DISCONTINUED | OUTPATIENT
Start: 2024-05-04 | End: 2024-05-05

## 2024-05-04 RX ORDER — DILTIAZEM HCL 120 MG
10 CAPSULE, EXT RELEASE 24 HR ORAL ONCE
Refills: 0 | Status: COMPLETED | OUTPATIENT
Start: 2024-05-04 | End: 2024-05-04

## 2024-05-04 RX ORDER — SIMVASTATIN 20 MG/1
20 TABLET, FILM COATED ORAL AT BEDTIME
Refills: 0 | Status: DISCONTINUED | OUTPATIENT
Start: 2024-05-04 | End: 2024-05-05

## 2024-05-04 RX ORDER — SIMVASTATIN 20 MG/1
1 TABLET, FILM COATED ORAL
Refills: 0 | DISCHARGE

## 2024-05-04 RX ADMIN — SODIUM CHLORIDE 500 MILLILITER(S): 9 INJECTION INTRAMUSCULAR; INTRAVENOUS; SUBCUTANEOUS at 15:52

## 2024-05-04 RX ADMIN — Medication 10 MG/HR: at 18:06

## 2024-05-04 RX ADMIN — APIXABAN 5 MILLIGRAM(S): 2.5 TABLET, FILM COATED ORAL at 16:31

## 2024-05-04 RX ADMIN — Medication 10 MILLIGRAM(S): at 15:09

## 2024-05-04 RX ADMIN — Medication 10 MILLIGRAM(S): at 15:52

## 2024-05-04 NOTE — ED PROVIDER NOTE - CARE PLAN
Principal Discharge DX:	New onset atrial fibrillation   1 Principal Discharge DX:	New onset atrial fibrillation  Secondary Diagnosis:	Atrial fibrillation with RVR

## 2024-05-04 NOTE — H&P ADULT - ATTENDING COMMENTS
HPI:  89F with PMH of HLD, anemia, thrombocytosis on ASA Recent admission for perforated appendicitis with an abscess s/p laparoscopic appendectomy and CHUY drain presented to the ED for palpitations. Spoke to the daughter who said pt only had palpitations and no other complaints. Was doing well after surgery otherwise    /96, , RR 20, O2 97% on RA    Sig labs:   Plts 645, BNP 1045, trops 13    EKG: Afib with RVR    CT pe protocol:   No CTA evidence of acute pulmonary embolus.    Pt was started on eliquis, given 1L NS bolus and started on cardizem drip in the ED after 10 of cardizem pushes x 2     (04 May 2024 20:42)    REVIEW OF SYSTEMS: see cc/HPI   CONSTITUTIONAL: No weakness, fevers or chills  EYES/ENT: No visual changes;  No vertigo or throat pain   NECK: No pain or stiffness  RESPIRATORY: No cough, wheezing, hemoptysis; No shortness of breath  CARDIOVASCULAR: No chest pain or palpitations  GASTROINTESTINAL: No abdominal or epigastric pain. No nausea, vomiting, or hematemesis; No diarrhea or constipation. No melena or hematochezia.  GENITOURINARY: No dysuria, frequency or hematuria  NEUROLOGICAL: No numbness or weakness  SKIN: No itching, rashes  ENDO: No hyperglycemia, No thyroid disorder, No dyslipidemia   HEM: No bleeding, No easy bruising, No anemia   PSYCHE: No psychosis, No mood disorder No hallucinations No delusion   MSK: No deformity, No fracture, No Joint swelling    Physical Exam:   General: WN/WD NAD  Neurology: A&Ox3, nonfocal, follows commands  Eyes: PERRLA/ EOMI  ENT/Neck: Neck supple, trachea midline, No JVD  Respiratory: CTA B/L, No wheezing, rales, rhonchi  CV: Irregular rate, irregular rhythm, S1S2, no murmurs, rubs or gallops  Abdominal: Soft, NT, ND +BS,   Extremities: No edema, + peripheral pulses  Skin: No Rashes, Hematoma, Ecchymosis    A/p HPI:  89F with PMH of HLD, anemia, thrombocytosis on ASA Recent admission for perforated appendicitis with an abscess s/p laparoscopic appendectomy and CHUY drain presented to the ED for palpitations. Spoke to the daughter who said pt only had palpitations and no other complaints. Was doing well after surgery otherwise    /96, , RR 20, O2 97% on RA    Sig labs:   Plts 645, BNP 1045, trops 13    EKG: Afib with RVR    CT pe protocol:   No CTA evidence of acute pulmonary embolus.    Pt was started on Eliquis, given 1L NS bolus and started on Cardizem drip in the ED after 10 of Cardizem pushes x 2     (04 May 2024 20:42)    REVIEW OF SYSTEMS: see cc/HPI   CONSTITUTIONAL: No weakness, fevers or chills  EYES/ENT: No visual changes;  No vertigo or throat pain   NECK: No pain or stiffness  RESPIRATORY: No cough, wheezing, hemoptysis; No shortness of breath  CARDIOVASCULAR: No chest pain (+) palpitations, see cc/HPI  GASTROINTESTINAL: No abdominal or epigastric pain. No nausea, vomiting, or hematemesis; No diarrhea or constipation. No melena or hematochezia. (+) s/p recent Lap Appy  GENITOURINARY: No dysuria, frequency or hematuria  NEUROLOGICAL: No numbness or weakness  SKIN: No itching, rashes  ENDO: No hyperglycemia, No thyroid disorder, (+) dyslipidemia   HEM: No bleeding, No easy bruising, No anemia   PSYCHE: No psychosis, No mood disorder No hallucinations No delusion   MSK: No deformity, No fracture, No Joint swelling    Physical Exam:   General: WN/WD NAD  Neurology: A&Ox3, nonfocal, follows commands  Eyes: PERRLA/ EOMI  ENT/Neck: Neck supple, trachea midline, No JVD  Respiratory: CTA B/L, No wheezing, rales, rhonchi  CV: Irregular rate, irregular rhythm, S1S2, no murmurs, rubs or gallops  Abdominal: Soft, NT, ND +BS,   Extremities: No edema, + peripheral pulses  Skin: No Rashes, Hematoma, Ecchymosis    A/p  New onset A. fib w/ RVR   -admit to tele   -IV Cardizem for now and start Metoprolol for heart rate goal < 100  -serial EKG and 2D echo   -TSH   -agree w/ low dose Eliquis     Thrombocythemia   -c/w OP Rx ( okay w/ use of own Rx)  -monitor CBC    Dyslipidemia   -c/w statin     s/p Lap appy  -post surgical care    PATIENT SEEN by ATTENDING 5/4/24 ( note revisited 5/5)

## 2024-05-04 NOTE — ED PROVIDER NOTE - OBJECTIVE STATEMENT
88 y/o F PMH, HLD, thrombocytosis, on ASA, s/p uncomplicated appendectomy 1 wk ago, p/w palpitations since last night. NO CP, SOB, cough, fever, leg swelling, v/d, or abnl bleeding. NO prior AFIB or arrythmia, No hx VTE. no cardiologist.

## 2024-05-04 NOTE — H&P ADULT - ASSESSMENT
89F with PMH of HLD, anemia, thrombocythemia on ASA Recent admission for perforated appendicitis with an abscess s/p laparoscopic appendectomy and CHUY drain presented to the ED for palpitations, pt found to be in afib with rvr in the ED.    #New onset atrial fibrillation with rapid ventricular response - now rate controlled  - ECG: atrial fibrillation with rapid ventricular response  - BNP 1045, trops 13  - in the setting of recent surgery  - [id number]: 3  - CT pe protocol: No CTA evidence of acute pulmonary embolus.  - on cardizem drip, reduced to 10  - will start metoprolol tart 25 q8 and then wean off cardizem, HR below 110  - will cw eliquis  - Cardiology consult pending  - 2D echocardiogram  - Follow TSH, lipid profile, HbA1c    #Elevated Dimer  - DDU cutoff 445  - had some leg swelling bilat per daughter  - will get le duplex just incase    #thrombocythemia  - on ASA and hydrea  - takes brand hydrea not generic as she gets reaction from generic  - daughter to bring it mamta    #HLD  - cw statin    #Recent lap appendectomy   - dressing intact no erythema    DVT ppx: eliquis  gi ppx: ppi  Dash tlc  Full code  ambulate as tolerated   89F with PMH of HLD, anemia, thrombocythemia on ASA Recent admission for perforated appendicitis with an abscess s/p laparoscopic appendectomy and CHUY drain presented to the ED for palpitations, pt found to be in afib with rvr in the ED.    #New onset atrial fibrillation with rapid ventricular response - now rate controlled  - ECG: atrial fibrillation with rapid ventricular response  - BNP 1045, trops 13  - in the setting of recent surgery  - [id number]: 3  - CT pe protocol: No CTA evidence of acute pulmonary embolus.  - on cardizem drip, reduced to 10  - will start metoprolol tart 25 q8 and then wean off cardizem, HR below 110  - will cw eliquis reduced dose, (weight and age)  - Cardiology consult pending  - 2D echocardiogram  - Follow TSH, lipid profile, HbA1c    #Elevated Dimer  - DDU cutoff 445  - had some leg swelling bilat per daughter  - will get le duplex just incase    #thrombocythemia  - on ASA and hydrea  - takes brand hydrea not generic as she gets reaction from generic  - daughter to bring it mamta    #HLD  - cw statin    #Recent lap appendectomy   - dressing intact no erythema    DVT ppx: eliquis 2.5 bid  gi ppx: ppi  Dash tlc  Full code  ambulate as tolerated

## 2024-05-04 NOTE — ED PROVIDER NOTE - ATTENDING CONTRIBUTION TO CARE
89 y/o F w/ palpitations. No cp, sob, calf pain, or prior VTE.    Exam as noted above.    Pt EKG AFIB RVR, No STEMI    PT given Cardizem 10mg and IVF, rate now 110-120's.    IMP: AFIB RVR, consider arrhythmia, PE, asc  P: labs, ivf, cont ccb, ekg, cxr, reassess.

## 2024-05-04 NOTE — ED PROVIDER NOTE - CLINICAL SUMMARY MEDICAL DECISION MAKING FREE TEXT BOX
Patient given rate control and later started on Cardizem drip and given p.o. Eliquis after clearance with surgery.  Surgery aware patient is in the hospital.  CT scan shows no evidence of PE.  Labs reviewed and patient has troponin of 13 and a BNP of 1000. Will admit to telemetry for A-fib with RVR requiring Cardizem drip. ED work up reviewed and results and plan of care discussed with patient. Patient requires admission for further work up, monitoring, and management. Need for admission discussed with patient.

## 2024-05-04 NOTE — ED ADULT NURSE NOTE - NS ED NURSE LEVEL OF CONSCIOUSNESS ORIENTATION
Please see Dietitian Initial Assessment for complete recommendations.  April Mijares, AMIRA, CDN #[id number]  Also available on [employer] Teams 
Oriented - self; Oriented - place; Oriented - time

## 2024-05-04 NOTE — ED PROVIDER NOTE - PHYSICAL EXAMINATION
CONSTITUTIONAL: NAD  SKIN: Warm dry  HEAD: NCAT  EYES: NL inspection  ENT: dry mm  NECK: Supple; non tender.  CARD: irreg irreg rhythm, 150-180   RESP: CTAB, no reso distress  ABD: S/NT no R/G  EXT: no pedal edema, calf non ttp  NEURO: Grossly unremarkable, awake, alert, following all commands  PSYCH: Cooperative, appropriate. CONSTITUTIONAL: NAD  SKIN: Warm dry  HEAD: NCAT  EYES: NL inspection  ENT: dry mm  NECK: Supple; non tender.  CARD: irreg irreg rhythm, 150-180   RESP: CTAB, no reso distress  ABD: S/NT no R/G, + trocar sites with clean dressings, non-tender  EXT: no pedal edema, calf non ttp  NEURO: Grossly unremarkable, awake, alert, following all commands  PSYCH: Cooperative, appropriate.

## 2024-05-04 NOTE — ED ADULT NURSE NOTE - NSFALLHARMRISKINTERV_ED_ALL_ED

## 2024-05-04 NOTE — H&P ADULT - NSHPREVIEWOFSYSTEMS_GEN_ALL_CORE
REVIEW OF SYSTEMS:CONSTITUTIONAL: No weakness, fevers or chills  EYES/ENT: No visual changes;  No vertigo or throat pain   NECK: No pain or stiffness  RESPIRATORY: No cough, wheezing, hemoptysis; No shortness of breath  CARDIOVASCULAR: No chest pain or palpitations  GASTROINTESTINAL: No abdominal or epigastric pain. No nausea, vomiting, or hematemesis; No diarrhea or constipation. No melena or hematochezia.  GENITOURINARY: No dysuria, frequency or hematuria  NEUROLOGICAL: No numbness or weakness  SKIN: No itching, rashes

## 2024-05-04 NOTE — H&P ADULT - HISTORY OF PRESENT ILLNESS
89F with PMH of HLD, anemia, thrombocytosis on ASA Recent admission for perforated appendicitis with an abscess s/p laparoscopic appendectomy and CHUY drain presented to the ED for palpitations.     /96, , RR 20, O2 97% on RA    Sig labs:   Plts 645, BNP 1045, trops 13    EKG: Afib with RVR    CT pe protocol:   No CTA evidence of acute pulmonary embolus.    Pt was started on eliquis, given 1L NS bolus and started on cardizem drip in the ED after 10 of cardizem pushes x 2     89F with PMH of HLD, anemia, thrombocytosis on ASA Recent admission for perforated appendicitis with an abscess s/p laparoscopic appendectomy and CHUY drain presented to the ED for palpitations. Spoke to the daughter who said pt only had palpitations and no other complaints. Was doing well after surgery otherwise    /96, , RR 20, O2 97% on RA    Sig labs:   Plts 645, BNP 1045, trops 13    EKG: Afib with RVR    CT pe protocol:   No CTA evidence of acute pulmonary embolus.    Pt was started on eliquis, given 1L NS bolus and started on cardizem drip in the ED after 10 of cardizem pushes x 2

## 2024-05-04 NOTE — ED PROVIDER NOTE - PROGRESS NOTE DETAILS
Pt endorsed to Dr. Meyer General surgery cleared for anticoagulation.  Started on Eliquis for A-fib.  [id number] score high

## 2024-05-04 NOTE — H&P ADULT - NSHPLABSRESULTS_GEN_ALL_CORE
LABS:                          12.4   9.11  )-----------( 645      ( 04 May 2024 15:46 )             38.0     05-04    138  |  100  |  6<L>  ----------------------------<  176<H>  tnp   |  25  |  0.6<L>    Ca    8.5      04 May 2024 15:46  Mg     2.3     05-04    TPro  7.1  /  Alb  3.7  /  TBili  0.3  /  DBili  x   /  AST  83<H>  /  ALT  27  /  AlkPhos  86  05-04    PT/INR - ( 04 May 2024 15:46 )   PT: 10.00 sec;   INR: 0.88 ratio         PTT - ( 04 May 2024 15:46 )  PTT:39.2 sec

## 2024-05-05 ENCOUNTER — TRANSCRIPTION ENCOUNTER (OUTPATIENT)
Age: 89
End: 2024-05-05

## 2024-05-05 VITALS
TEMPERATURE: 98 F | OXYGEN SATURATION: 99 % | DIASTOLIC BLOOD PRESSURE: 58 MMHG | HEART RATE: 70 BPM | RESPIRATION RATE: 18 BRPM | SYSTOLIC BLOOD PRESSURE: 100 MMHG

## 2024-05-05 LAB
ALBUMIN SERPL ELPH-MCNC: 3 G/DL — LOW (ref 3.5–5.2)
ALP SERPL-CCNC: 73 U/L — SIGNIFICANT CHANGE UP (ref 30–115)
ALT FLD-CCNC: 14 U/L — SIGNIFICANT CHANGE UP (ref 0–41)
ANION GAP SERPL CALC-SCNC: 8 MMOL/L — SIGNIFICANT CHANGE UP (ref 7–14)
AST SERPL-CCNC: 16 U/L — SIGNIFICANT CHANGE UP (ref 0–41)
BASOPHILS # BLD AUTO: 0.07 K/UL — SIGNIFICANT CHANGE UP (ref 0–0.2)
BASOPHILS NFR BLD AUTO: 0.8 % — SIGNIFICANT CHANGE UP (ref 0–1)
BILIRUB SERPL-MCNC: 0.2 MG/DL — SIGNIFICANT CHANGE UP (ref 0.2–1.2)
BUN SERPL-MCNC: 8 MG/DL — LOW (ref 10–20)
CALCIUM SERPL-MCNC: 7.9 MG/DL — LOW (ref 8.4–10.5)
CHLORIDE SERPL-SCNC: 105 MMOL/L — SIGNIFICANT CHANGE UP (ref 98–110)
CHOLEST SERPL-MCNC: 175 MG/DL — SIGNIFICANT CHANGE UP
CO2 SERPL-SCNC: 26 MMOL/L — SIGNIFICANT CHANGE UP (ref 17–32)
CREAT SERPL-MCNC: 0.5 MG/DL — LOW (ref 0.7–1.5)
EGFR: 90 ML/MIN/1.73M2 — SIGNIFICANT CHANGE UP
EOSINOPHIL # BLD AUTO: 0.03 K/UL — SIGNIFICANT CHANGE UP (ref 0–0.7)
EOSINOPHIL NFR BLD AUTO: 0.3 % — SIGNIFICANT CHANGE UP (ref 0–8)
GLUCOSE SERPL-MCNC: 111 MG/DL — HIGH (ref 70–99)
HCT VFR BLD CALC: 31.5 % — LOW (ref 37–47)
HDLC SERPL-MCNC: 41 MG/DL — LOW
HGB BLD-MCNC: 10.1 G/DL — LOW (ref 12–16)
IMM GRANULOCYTES NFR BLD AUTO: 2.3 % — HIGH (ref 0.1–0.3)
LIPID PNL WITH DIRECT LDL SERPL: 92 MG/DL — SIGNIFICANT CHANGE UP
LYMPHOCYTES # BLD AUTO: 1.18 K/UL — LOW (ref 1.2–3.4)
LYMPHOCYTES # BLD AUTO: 13.7 % — LOW (ref 20.5–51.1)
MAGNESIUM SERPL-MCNC: 2.2 MG/DL — SIGNIFICANT CHANGE UP (ref 1.8–2.4)
MCHC RBC-ENTMCNC: 32.1 G/DL — SIGNIFICANT CHANGE UP (ref 32–37)
MCHC RBC-ENTMCNC: 33.7 PG — HIGH (ref 27–31)
MCV RBC AUTO: 105 FL — HIGH (ref 81–99)
MONOCYTES # BLD AUTO: 0.82 K/UL — HIGH (ref 0.1–0.6)
MONOCYTES NFR BLD AUTO: 9.5 % — HIGH (ref 1.7–9.3)
NEUTROPHILS # BLD AUTO: 6.29 K/UL — SIGNIFICANT CHANGE UP (ref 1.4–6.5)
NEUTROPHILS NFR BLD AUTO: 73.4 % — SIGNIFICANT CHANGE UP (ref 42.2–75.2)
NON HDL CHOLESTEROL: 134 MG/DL — HIGH
NRBC # BLD: 0 /100 WBCS — SIGNIFICANT CHANGE UP (ref 0–0)
PLATELET # BLD AUTO: 631 K/UL — HIGH (ref 130–400)
PMV BLD: 9.5 FL — SIGNIFICANT CHANGE UP (ref 7.4–10.4)
POTASSIUM SERPL-MCNC: 4.7 MMOL/L — SIGNIFICANT CHANGE UP (ref 3.5–5)
POTASSIUM SERPL-SCNC: 4.7 MMOL/L — SIGNIFICANT CHANGE UP (ref 3.5–5)
PROT SERPL-MCNC: 5.2 G/DL — LOW (ref 6–8)
RBC # BLD: 3 M/UL — LOW (ref 4.2–5.4)
RBC # FLD: 17.4 % — HIGH (ref 11.5–14.5)
SODIUM SERPL-SCNC: 139 MMOL/L — SIGNIFICANT CHANGE UP (ref 135–146)
TRIGL SERPL-MCNC: 156 MG/DL — HIGH
WBC # BLD: 8.59 K/UL — SIGNIFICANT CHANGE UP (ref 4.8–10.8)
WBC # FLD AUTO: 8.59 K/UL — SIGNIFICANT CHANGE UP (ref 4.8–10.8)

## 2024-05-05 PROCEDURE — 99239 HOSP IP/OBS DSCHRG MGMT >30: CPT

## 2024-05-05 RX ORDER — METOPROLOL TARTRATE 50 MG
50 TABLET ORAL DAILY
Refills: 0 | Status: DISCONTINUED | OUTPATIENT
Start: 2024-05-05 | End: 2024-05-05

## 2024-05-05 RX ORDER — APIXABAN 2.5 MG/1
1 TABLET, FILM COATED ORAL
Qty: 60 | Refills: 0
Start: 2024-05-05 | End: 2024-06-03

## 2024-05-05 RX ORDER — ASPIRIN/CALCIUM CARB/MAGNESIUM 324 MG
1 TABLET ORAL
Qty: 0 | Refills: 0 | DISCHARGE

## 2024-05-05 RX ORDER — METOPROLOL TARTRATE 50 MG
1 TABLET ORAL
Qty: 30 | Refills: 0
Start: 2024-05-05 | End: 2024-06-03

## 2024-05-05 RX ADMIN — Medication 25 MILLIGRAM(S): at 01:06

## 2024-05-05 RX ADMIN — Medication 25 MILLIGRAM(S): at 05:37

## 2024-05-05 RX ADMIN — APIXABAN 2.5 MILLIGRAM(S): 2.5 TABLET, FILM COATED ORAL at 05:37

## 2024-05-05 RX ADMIN — Medication 81 MILLIGRAM(S): at 11:34

## 2024-05-05 NOTE — DISCHARGE NOTE NURSING/CASE MANAGEMENT/SOCIAL WORK - PATIENT PORTAL LINK FT
You can access the FollowMyHealth Patient Portal offered by [facility] by registering at the following website: [email or web address]. By joining [facility]’s FollowMyHealth portal, you will also be able to view your health information using other applications (apps) compatible with our system.

## 2024-05-05 NOTE — DISCHARGE NOTE PROVIDER - HOSPITAL COURSE
89F with PMH of HLD, anemia, thrombocythemia on ASA Recent admission for perforated appendicitis with an abscess s/p laparoscopic appendectomy and CHUY drain presented to the ED for palpitations, pt found to be in afib with rvr in the ED.    1. New onset atrial fibrillation with rapid ventricular response - now rate controlled  - Telemetry                           - ECG: atrial fibrillation with rapid ventricular response      -TSH:pending       - Hgba1c:pending    -CXR:WNL   - Started on cardizem drip   - will start metoprolol tart 25 q8 and then wean off cardizem, HR below 110. DC on metoprolol 50mg po daily  - [id number]: 3 will cw eliquis reduced dose, (weight and age). DC on eliquis 2.5 mg po bid   - Echocardio defer to outpatient   - BNP 1045, trops 13  - CT pe protocol:   a) No CTA evidence of acute pulmonary embolus.    2. Elevated Dimer  - DDU cutoff 445  - had some leg swelling bilat per daughter  - Venous duplex:pending    3. thrombocythemia  - on ASA and hydrea  - takes brand hydrea not generic as she gets reaction from generic  - daughter to bring it mamta    4. HLD  - cw statin    5. Recent lap appendectomy   - dressing intact no erythema 89F with PMH of HLD, anemia, thrombocythemia on ASA Recent admission for perforated appendicitis with an abscess s/p laparoscopic appendectomy and CHUY drain presented to the ED for palpitations, pt found to be in afib with rvr in the ED.    1. New onset atrial fibrillation with rapid ventricular response - now rate controlled  - Telemetry                           - ECG: atrial fibrillation with rapid ventricular response      -TSH:pending       - Hgba1c:pending    -CXR:WNL   - Started on cardizem drip   - will start metoprolol tart 25 q8 and then wean off cardizem, HR below 110. DC on metoprolol 50mg po daily  - [id number]: 3 will cw eliquis reduced dose, (weight and age). DC on eliquis 2.5 mg po bid   - Echocardio defer to outpatient   - BNP 1045, trops 13  - CT pe protocol:   a) No CTA evidence of acute pulmonary embolus.    2. Elevated Dimer which is within normal limit for a pt with myeloproliferative disease   - DDU cutoff 445    3. thrombocythemia  - on ASA and hydrea  - takes brand hydrea not generic as she gets reaction from generic  - daughter to bring it mamta    4. HLD  - cw statin    5. Recent lap appendectomy   - dressing intact no erythema    Patient feels well. She has no complains and asking to go home. She will need TSH and echo as outpatient. no need for further inpatient eval

## 2024-05-05 NOTE — DISCHARGE NOTE PROVIDER - CARE PROVIDER_API CALL
Jose Daniel Abraham  [facility]  [address], [address]  [place] [address]  Phone: [phone]  Fax: ([phone]  Established Patient  Follow Up Time: 1 week    Michele Kim  Cardiovascular Disease  [address] [place] [address], [address]  [place] [address]  Phone: [phone]  Fax: [phone]  Follow Up Time: Routine

## 2024-05-05 NOTE — DISCHARGE NOTE PROVIDER - PROVIDER TOKENS
PROVIDER:[TOKEN:[[id number]],FOLLOWUP:[1 week],ESTABLISHEDPATIENT:[T]],PROVIDER:[TOKEN:[[id number]],FOLLOWUP:[Routine]]

## 2024-05-05 NOTE — DISCHARGE NOTE PROVIDER - NSDCMRMEDTOKEN_GEN_ALL_CORE_FT
Adult Aspirin 81 mg oral tablet, chewable: 1 tab(s) chewed  hydroxyurea 500 mg oral capsule: 15 mg/kg orally once a day  simvastatin 20 mg oral tablet: 1 tab(s) orally once a day (at bedtime)   Adult Aspirin 81 mg oral tablet, chewable: 1 tab(s) chewed once a day  apixaban 2.5 mg oral tablet: 1 tab(s) orally every 12 hours  hydroxyurea 500 mg oral capsule: 15 mg/kg orally once a day  metoprolol succinate 50 mg oral tablet, extended release: 1 tab(s) orally once a day  simvastatin 20 mg oral tablet: 1 tab(s) orally once a day (at bedtime)

## 2024-05-05 NOTE — DISCHARGE NOTE PROVIDER - NSDCFUSCHEDAPPT_GEN_ALL_CORE_FT
Kang Anderson  [facility]  [facility]  Scheduled Appointment: 05/13/2024

## 2024-05-05 NOTE — PROGRESS NOTE ADULT - SUBJECTIVE AND OBJECTIVE BOX
MANDO, YOUNG  89y  [facility]      Patient is a 89y old  Female who presents with a chief complaint of     INTERVAL HPI/OVERNIGHT EVENTS:        REVIEW OF SYSTEMS:        FAMILY HISTORY:    T(C): 36.7 (05-05-24 @ 08:03), Max: 36.8 (05-04-24 @ 14:53)  HR: 70 (05-05-24 @ 08:03) (69 - 168)  BP: 100/58 (05-05-24 @ 08:03) (93/66 - 156/96)  RR: 18 (05-05-24 @ 08:03) (17 - 20)  SpO2: 99% (05-05-24 @ 08:03) (96% - 100%)  Wt(kg): --Vital Signs Last 24 Hrs  T(C): 36.7 (05 May 2024 08:03), Max: 36.8 (04 May 2024 14:53)  T(F): 98 (05 May 2024 08:03), Max: 98.2 (04 May 2024 14:53)  HR: 70 (05 May 2024 08:03) (69 - 168)  BP: 100/58 (05 May 2024 08:03) (93/66 - 156/96)  BP(mean): 72 (05 May 2024 05:04) (65 - 103)  RR: 18 (05 May 2024 08:03) (17 - 20)  SpO2: 99% (05 May 2024 08:03) (96% - 100%)    Parameters below as of 05 May 2024 05:04  Patient On (Oxygen Delivery Method): room air        PHYSICAL EXAM:  GENERAL: NAD, well-groomed, well-developed  HEAD:  Atraumatic, Normocephalic  EYES: EOMI, PERRLA, conjunctiva and sclera clear  ENMT: No tonsillar erythema, exudates, or enlargement; Moist mucous membranes, Good dentition, No lesions  NECK: Supple, No JVD, Normal thyroid  NERVOUS SYSTEM:  Alert & Oriented X3, Good concentration; Motor Strength 5/5 B/L upper and lower extremities; DTRs 2+ intact and symmetric  PULM: Clear to auscultation bilaterally  CARDIAC: Regular rate and rhythm; No murmurs, rubs, or gallops  GI: Soft, Nontender, Nondistended; Bowel sounds present  EXTREMITIES:  2+ Peripheral Pulses, No clubbing, cyanosis, or edema  LYMPH: No lymphadenopathy noted  SKIN: No rashes or lesions    Consultant(s) Notes Reviewed:  [x ] YES  [ ] NO  Care Discussed with Consultants/Other Providers [ x] YES  [ ] NO    LABS:                            12.4   9.11  )-----------( 645      ( 04 May 2024 15:46 )             38.0   05-05    139  |  105  |  8<L>  ----------------------------<  111<H>  4.7   |  26  |  0.5<L>    Ca    7.9<L>      05 May 2024 07:47  Mg     2.2     05-05    TPro  5.2<L>  /  Alb  3.0<L>  /  TBili  0.2  /  DBili  x   /  AST  16  /  ALT  14  /  AlkPhos  73  05-05            acetaminophen     Tablet .. 650 milliGRAM(s) Oral every 6 hours PRN  apixaban 2.5 milliGRAM(s) Oral every 12 hours  aspirin  chewable 81 milliGRAM(s) Oral daily  metoprolol tartrate 25 milliGRAM(s) Oral three times a day  simvastatin 20 milliGRAM(s) Oral at bedtime    89F with PMH of HLD, anemia, thrombocythemia on ASA Recent admission for perforated appendicitis with an abscess s/p laparoscopic appendectomy and CHUY drain presented to the ED for palpitations, pt found to be in afib with rvr in the ED.    1. New onset atrial fibrillation with rapid ventricular response - now rate controlled  - Telemetry                           - ECG: atrial fibrillation with rapid ventricular response      -TSH:pending       - Hgba1c:pending    -CXR:WNL   - Started on cardizem drip   - will start metoprolol tart 25 q8 and then wean off cardizem, HR below 110  - [id number]: 3 will cw eliquis reduced dose, (weight and age)  - Echocardio:pending   - BNP 1045, trops 13  - CT pe protocol:   a) No CTA evidence of acute pulmonary embolus.  - Cardiology consult pending    2. Elevated Dimer  - DDU cutoff 445  - had some leg swelling bilat per daughter  - Venous duplex:pending    3. thrombocythemia  - on ASA and hydrea  - takes brand hydrea not generic as she gets reaction from generic  - daughter to bring it mamta    4. HLD  - cw statin    5. Recent lap appendectomy   - dressing intact no erythema    DVT ppx: eliquis 2.5 bid  gi ppx: ppi  Dash tlc  Full code  ambulate as tolerated

## 2024-05-06 LAB
A1C WITH ESTIMATED AVERAGE GLUCOSE RESULT: 5.6 % — SIGNIFICANT CHANGE UP (ref 4–5.6)
ESTIMATED AVERAGE GLUCOSE: 114 MG/DL — SIGNIFICANT CHANGE UP (ref 68–114)
T4 FREE+ TSH PNL SERPL: 2.52 UIU/ML — SIGNIFICANT CHANGE UP (ref 0.27–4.2)

## 2024-05-09 DIAGNOSIS — E78.5 HYPERLIPIDEMIA, UNSPECIFIED: ICD-10-CM

## 2024-05-09 DIAGNOSIS — I48.91 UNSPECIFIED ATRIAL FIBRILLATION: ICD-10-CM

## 2024-05-09 DIAGNOSIS — D75.839 THROMBOCYTOSIS, UNSPECIFIED: ICD-10-CM

## 2024-05-09 DIAGNOSIS — Z79.82 LONG TERM (CURRENT) USE OF ASPIRIN: ICD-10-CM

## 2024-05-09 DIAGNOSIS — Z90.49 ACQUIRED ABSENCE OF OTHER SPECIFIED PARTS OF DIGESTIVE TRACT: ICD-10-CM

## 2024-05-13 ENCOUNTER — APPOINTMENT (OUTPATIENT)
Dept: SURGERY | Facility: CLINIC | Age: 89
End: 2024-05-13
Payer: MEDICARE

## 2024-05-13 VITALS
HEART RATE: 85 BPM | BODY MASS INDEX: 16.01 KG/M2 | OXYGEN SATURATION: 96 % | SYSTOLIC BLOOD PRESSURE: 112 MMHG | HEIGHT: 62 IN | WEIGHT: 87 LBS | DIASTOLIC BLOOD PRESSURE: 70 MMHG

## 2024-05-13 DIAGNOSIS — D64.9 ANEMIA, UNSPECIFIED: ICD-10-CM

## 2024-05-13 DIAGNOSIS — K50.00 CROHN'S DISEASE OF SMALL INTESTINE WITHOUT COMPLICATIONS: ICD-10-CM

## 2024-05-13 DIAGNOSIS — A41.9 SEPSIS, UNSPECIFIED ORGANISM: ICD-10-CM

## 2024-05-13 DIAGNOSIS — K35.33 ACUTE APPENDICITIS WITH PERFORATION, LOCALIZED PERITONITIS, AND GANGRENE, WITH ABSCESS: ICD-10-CM

## 2024-05-13 DIAGNOSIS — E78.5 HYPERLIPIDEMIA, UNSPECIFIED: ICD-10-CM

## 2024-05-13 DIAGNOSIS — K66.0 PERITONEAL ADHESIONS (POSTPROCEDURAL) (POSTINFECTION): ICD-10-CM

## 2024-05-13 PROCEDURE — 99212 OFFICE O/P EST SF 10 MIN: CPT | Mod: 24

## 2024-05-18 NOTE — HISTORY OF PRESENT ILLNESS
[de-identified] : This is 89 y/o female who had Laparoscopic Appendectomy and Laparoscopic YARITZA on 4/27/2024 [de-identified] : Patient is doing well. Tolerates po diet. Surgical wounds healed well.

## 2024-05-18 NOTE — PHYSICAL EXAM
[JVD] : jugular venous distention ~L [Normal Thyroid] : the thyroid was normal [Normal Breath Sounds] : Normal breath sounds [Normal Heart Sounds] : normal heart sounds [Normal Rate and Rhythm] : normal rate and rhythm [Abdominal Masses] : No abdominal masses [Abdomen Tenderness] : ~T ~M No abdominal tenderness [Tender] : was nontender [Enlarged] : not enlarged [No Rash or Lesion] : No rash or lesion [Alert] : alert [Oriented to Person] : oriented to person [Oriented to Place] : oriented to place [Oriented to Time] : oriented to time [Calm] : calm [de-identified] : comfortable [de-identified] : GONSALO [de-identified] : supple [de-identified] : wnl [de-identified] : soft, nontender

## 2024-05-18 NOTE — ASSESSMENT
[FreeTextEntry1] : 89 y/o female who had Laparoscopic Appendectomy and Laparoscopic YARITZA on 4/27/2024

## 2024-05-25 LAB
CULTURE RESULTS: SIGNIFICANT CHANGE UP
SPECIMEN SOURCE: SIGNIFICANT CHANGE UP

## 2024-06-12 LAB
CULTURE RESULTS: SIGNIFICANT CHANGE UP
SPECIMEN SOURCE: SIGNIFICANT CHANGE UP

## 2024-11-08 NOTE — STROKE CODE NOTE - NIH STROKE SCALE: 5B. MOTOR ARM, RIGHT, QM
Last Visit Date: 10/7/2024   Next Visit Date: 1/10/2025   
(0) No drift; limb holds 90 (or 45) degrees for full 10 secs

## 2025-02-24 ENCOUNTER — INPATIENT (INPATIENT)
Facility: HOSPITAL | Age: 89
LOS: 1 days | Discharge: HOME | DRG: 310 | End: 2025-02-26
Attending: INTERNAL MEDICINE | Admitting: INTERNAL MEDICINE
Payer: MEDICARE

## 2025-02-24 VITALS
SYSTOLIC BLOOD PRESSURE: 86 MMHG | DIASTOLIC BLOOD PRESSURE: 62 MMHG | WEIGHT: 87.96 LBS | RESPIRATION RATE: 16 BRPM | OXYGEN SATURATION: 98 % | HEART RATE: 146 BPM

## 2025-02-24 DIAGNOSIS — I48.91 UNSPECIFIED ATRIAL FIBRILLATION: ICD-10-CM

## 2025-02-24 LAB
ALBUMIN SERPL ELPH-MCNC: 4.4 G/DL — SIGNIFICANT CHANGE UP (ref 3.5–5.2)
ALP SERPL-CCNC: 184 U/L — HIGH (ref 30–115)
ALT FLD-CCNC: 476 U/L — HIGH (ref 0–41)
ANION GAP SERPL CALC-SCNC: 19 MMOL/L — HIGH (ref 7–14)
ANISOCYTOSIS BLD QL: SIGNIFICANT CHANGE UP
AST SERPL-CCNC: 175 U/L — HIGH (ref 0–41)
BASE EXCESS BLDV CALC-SCNC: -2.8 MMOL/L — LOW (ref -2–3)
BASOPHILS # BLD AUTO: 0.05 K/UL — SIGNIFICANT CHANGE UP (ref 0–0.2)
BASOPHILS NFR BLD AUTO: 0.9 % — SIGNIFICANT CHANGE UP (ref 0–1)
BILIRUB DIRECT SERPL-MCNC: 0.2 MG/DL — SIGNIFICANT CHANGE UP (ref 0–0.3)
BILIRUB INDIRECT FLD-MCNC: 0.7 MG/DL — SIGNIFICANT CHANGE UP (ref 0.2–1.2)
BILIRUB SERPL-MCNC: 0.9 MG/DL — SIGNIFICANT CHANGE UP (ref 0.2–1.2)
BUN SERPL-MCNC: 20 MG/DL — SIGNIFICANT CHANGE UP (ref 10–20)
CA-I SERPL-SCNC: 1.09 MMOL/L — LOW (ref 1.15–1.33)
CALCIUM SERPL-MCNC: 8.4 MG/DL — SIGNIFICANT CHANGE UP (ref 8.4–10.5)
CHLORIDE SERPL-SCNC: 97 MMOL/L — LOW (ref 98–110)
CO2 SERPL-SCNC: 19 MMOL/L — SIGNIFICANT CHANGE UP (ref 17–32)
CREAT SERPL-MCNC: 0.9 MG/DL — SIGNIFICANT CHANGE UP (ref 0.7–1.5)
EGFR: 61 ML/MIN/1.73M2 — SIGNIFICANT CHANGE UP
EOSINOPHIL # BLD AUTO: 0 K/UL — SIGNIFICANT CHANGE UP (ref 0–0.7)
EOSINOPHIL NFR BLD AUTO: 0 % — SIGNIFICANT CHANGE UP (ref 0–8)
GAS PNL BLDV: 130 MMOL/L — LOW (ref 136–145)
GAS PNL BLDV: SIGNIFICANT CHANGE UP
GAS PNL BLDV: SIGNIFICANT CHANGE UP
GIANT PLATELETS BLD QL SMEAR: PRESENT — SIGNIFICANT CHANGE UP
GLUCOSE SERPL-MCNC: 135 MG/DL — HIGH (ref 70–99)
HCO3 BLDV-SCNC: 24 MMOL/L — SIGNIFICANT CHANGE UP (ref 22–29)
HCT VFR BLD CALC: 37.3 % — SIGNIFICANT CHANGE UP (ref 37–47)
HCT VFR BLDA CALC: 37 % — SIGNIFICANT CHANGE UP (ref 34.5–46.5)
HGB BLD CALC-MCNC: 12.4 G/DL — SIGNIFICANT CHANGE UP (ref 11.7–16.1)
HGB BLD-MCNC: 12.2 G/DL — SIGNIFICANT CHANGE UP (ref 12–16)
LACTATE BLDV-MCNC: 3.3 MMOL/L — HIGH (ref 0.5–2)
LACTATE SERPL-SCNC: 2 MMOL/L — SIGNIFICANT CHANGE UP (ref 0.7–2)
LYMPHOCYTES # BLD AUTO: 0.36 K/UL — LOW (ref 1.2–3.4)
LYMPHOCYTES # BLD AUTO: 7.1 % — LOW (ref 20.5–51.1)
MACROCYTES BLD QL: SIGNIFICANT CHANGE UP
MANUAL SMEAR VERIFICATION: SIGNIFICANT CHANGE UP
MCHC RBC-ENTMCNC: 32.7 G/DL — SIGNIFICANT CHANGE UP (ref 32–37)
MCHC RBC-ENTMCNC: 37 PG — HIGH (ref 27–31)
MCV RBC AUTO: 113 FL — HIGH (ref 81–99)
MONOCYTES # BLD AUTO: 0.41 K/UL — SIGNIFICANT CHANGE UP (ref 0.1–0.6)
MONOCYTES NFR BLD AUTO: 8 % — SIGNIFICANT CHANGE UP (ref 1.7–9.3)
NEUTROPHILS # BLD AUTO: 3.8 K/UL — SIGNIFICANT CHANGE UP (ref 1.4–6.5)
NEUTROPHILS NFR BLD AUTO: 74.3 % — SIGNIFICANT CHANGE UP (ref 42.2–75.2)
OVALOCYTES BLD QL SMEAR: SLIGHT — SIGNIFICANT CHANGE UP
PCO2 BLDV: 47 MMHG — HIGH (ref 39–42)
PH BLDV: 7.31 — LOW (ref 7.32–7.43)
PLAT MORPH BLD: NORMAL — SIGNIFICANT CHANGE UP
PLATELET # BLD AUTO: 256 K/UL — SIGNIFICANT CHANGE UP (ref 130–400)
PMV BLD: 10.3 FL — SIGNIFICANT CHANGE UP (ref 7.4–10.4)
PO2 BLDV: 23 MMHG — LOW (ref 25–45)
POIKILOCYTOSIS BLD QL AUTO: SLIGHT — SIGNIFICANT CHANGE UP
POLYCHROMASIA BLD QL SMEAR: SLIGHT — SIGNIFICANT CHANGE UP
POTASSIUM BLDV-SCNC: 5.7 MMOL/L — HIGH (ref 3.5–5.1)
POTASSIUM SERPL-MCNC: 4.7 MMOL/L — SIGNIFICANT CHANGE UP (ref 3.5–5)
POTASSIUM SERPL-SCNC: 4.7 MMOL/L — SIGNIFICANT CHANGE UP (ref 3.5–5)
PROT SERPL-MCNC: 7 G/DL — SIGNIFICANT CHANGE UP (ref 6–8)
RBC # BLD: 3.3 M/UL — LOW (ref 4.2–5.4)
RBC # FLD: 13.6 % — SIGNIFICANT CHANGE UP (ref 11.5–14.5)
RBC BLD AUTO: ABNORMAL
SAO2 % BLDV: 29.3 % — LOW (ref 67–88)
SODIUM SERPL-SCNC: 135 MMOL/L — SIGNIFICANT CHANGE UP (ref 135–146)
TROPONIN T, HIGH SENSITIVITY RESULT: 25 NG/L — HIGH (ref 6–13)
TROPONIN T, HIGH SENSITIVITY RESULT: 26 NG/L — HIGH (ref 6–13)
VARIANT LYMPHS # BLD: 9.7 % — HIGH (ref 0–5)
VARIANT LYMPHS NFR BLD MANUAL: 9.7 % — HIGH (ref 0–5)
WBC # BLD: 5.11 K/UL — SIGNIFICANT CHANGE UP (ref 4.8–10.8)
WBC # FLD AUTO: 5.11 K/UL — SIGNIFICANT CHANGE UP (ref 4.8–10.8)

## 2025-02-24 PROCEDURE — 99291 CRITICAL CARE FIRST HOUR: CPT

## 2025-02-24 PROCEDURE — 93306 TTE W/DOPPLER COMPLETE: CPT

## 2025-02-24 PROCEDURE — 80074 ACUTE HEPATITIS PANEL: CPT

## 2025-02-24 PROCEDURE — 83036 HEMOGLOBIN GLYCOSYLATED A1C: CPT

## 2025-02-24 PROCEDURE — 82306 VITAMIN D 25 HYDROXY: CPT

## 2025-02-24 PROCEDURE — 74177 CT ABD & PELVIS W/CONTRAST: CPT | Mod: 26

## 2025-02-24 PROCEDURE — 93010 ELECTROCARDIOGRAM REPORT: CPT | Mod: 76

## 2025-02-24 PROCEDURE — 80061 LIPID PANEL: CPT

## 2025-02-24 PROCEDURE — 83735 ASSAY OF MAGNESIUM: CPT

## 2025-02-24 PROCEDURE — 86709 HEPATITIS A IGM ANTIBODY: CPT

## 2025-02-24 PROCEDURE — 84443 ASSAY THYROID STIM HORMONE: CPT

## 2025-02-24 PROCEDURE — 86706 HEP B SURFACE ANTIBODY: CPT

## 2025-02-24 PROCEDURE — 93005 ELECTROCARDIOGRAM TRACING: CPT

## 2025-02-24 PROCEDURE — 97162 PT EVAL MOD COMPLEX 30 MIN: CPT | Mod: GP

## 2025-02-24 PROCEDURE — 36415 COLL VENOUS BLD VENIPUNCTURE: CPT

## 2025-02-24 PROCEDURE — 85025 COMPLETE CBC W/AUTO DIFF WBC: CPT

## 2025-02-24 PROCEDURE — 71045 X-RAY EXAM CHEST 1 VIEW: CPT | Mod: 26

## 2025-02-24 PROCEDURE — 86708 HEPATITIS A ANTIBODY: CPT

## 2025-02-24 PROCEDURE — 82977 ASSAY OF GGT: CPT

## 2025-02-24 PROCEDURE — 80053 COMPREHEN METABOLIC PANEL: CPT

## 2025-02-24 RX ORDER — PROCAINAMIDE HCL 500 MG
1000 TABLET, EXTENDED RELEASE ORAL ONCE
Refills: 0 | Status: DISCONTINUED | OUTPATIENT
Start: 2025-02-24 | End: 2025-02-24

## 2025-02-24 RX ORDER — SODIUM CHLORIDE 9 G/1000ML
1000 INJECTION, SOLUTION INTRAVENOUS ONCE
Refills: 0 | Status: COMPLETED | OUTPATIENT
Start: 2025-02-24 | End: 2025-02-24

## 2025-02-24 RX ORDER — DILTIAZEM HYDROCHLORIDE 240 MG/1
10 TABLET, EXTENDED RELEASE ORAL ONCE
Refills: 0 | Status: COMPLETED | OUTPATIENT
Start: 2025-02-24 | End: 2025-02-24

## 2025-02-24 RX ADMIN — DILTIAZEM HYDROCHLORIDE 10 MILLIGRAM(S): 240 TABLET, EXTENDED RELEASE ORAL at 19:00

## 2025-02-24 RX ADMIN — SODIUM CHLORIDE 1000 MILLILITER(S): 9 INJECTION, SOLUTION INTRAVENOUS at 20:30

## 2025-02-24 RX ADMIN — SODIUM CHLORIDE 1000 MILLILITER(S): 9 INJECTION, SOLUTION INTRAVENOUS at 19:29

## 2025-02-24 NOTE — ED PROVIDER NOTE - OBJECTIVE STATEMENT
89-year-old female past medical history of hyperlipidemia, anemia, thrombocytosis on ASA.  Patient presents with palpitations for 4 hours.  Daughter states that a couple of days ago she did not feel well and then today around 1 PM she started to complain of feeling her heart beat out of her chest.  Patient also complains of epigastric pain.  Daughter says patient has been unable to eat.  Patient denies chest pain, nausea, vomiting, diarrhea, lightheadedness, dizziness, shortness of breath. 89-year-old female past medical history of hyperlipidemia, anemia, thrombocytosis on ASA.  Patient presents with palpitations for 4 hours.  Daughter states that a couple of days ago she did not feel well and then today around 1 PM she started to complain of feeling her heart beat out of her chest.  Patient also complains of epigastric pain.  Daughter says patient has been unable to eat for 4d.  Patient denies chest pain, nausea, vomiting, diarrhea, lightheadedness, dizziness, shortness of breath.

## 2025-02-24 NOTE — ED PROVIDER NOTE - CLINICAL SUMMARY MEDICAL DECISION MAKING FREE TEXT BOX
Concern for pancreatic or intra-abdominal mass CT abdomen pelvis as patient does appear jaundiced as well.

## 2025-02-24 NOTE — ED PROVIDER NOTE - ATTENDING CONTRIBUTION TO CARE
89-year-old female to ED with palpitations for past 4 days has not been eating well because she has not had an appetite.  No significant past medical history brought in by daughter who is also the  states he is just feeling weak and tired.  Also complaining of feeling palpitations over the past several days noted to have mild tenderness to the mid epigastrium. Afebrile vital signs stable exam as noted clear lungs bilaterally conjunctiva pink HEENT normal, Rapid irregular heart rate abdomen soft nontender and neuro nonfocal.

## 2025-02-24 NOTE — ED PROVIDER NOTE - PHYSICAL EXAMINATION
VITAL SIGNS: I have reviewed nursing notes and confirm.  CONSTITUTIONAL: Well-appearing, non-toxic, in NAD  SKIN: Warm dry, normal skin turgor. Jaundice noted  EYES: Scleral icterus noted  ENT: Moist mucous membranes, normal pharynx with no erythema or exudates  NECK: Supple; full ROM. Nontender. No cervical LAD  CARD: Irregularly irregular  RESP: Clear to ausculation bilaterally.  No rales, rhonchi, or wheezing.  ABD: Epigastric tenderness noted  EXT: Full ROM, no bony tenderness, no pedal edema, no calf tenderness

## 2025-02-24 NOTE — ED ADULT NURSE NOTE - NSFALLHARMRISKINTERV_ED_ALL_ED

## 2025-02-24 NOTE — ED ADULT NURSE NOTE - OBJECTIVE STATEMENT
Pt presents for palpitations since today with decreased p/o intake x 3 days. On ED arrival patient tachycardic in rapid Afib, hypotensive , sepsis protocol initiated

## 2025-02-25 ENCOUNTER — TRANSCRIPTION ENCOUNTER (OUTPATIENT)
Age: 89
End: 2025-02-25

## 2025-02-25 LAB
A1C WITH ESTIMATED AVERAGE GLUCOSE RESULT: 5.4 % — SIGNIFICANT CHANGE UP (ref 4–5.6)
ALBUMIN SERPL ELPH-MCNC: 3.3 G/DL — LOW (ref 3.5–5.2)
ALP SERPL-CCNC: 135 U/L — HIGH (ref 30–115)
ALT FLD-CCNC: 287 U/L — HIGH (ref 0–41)
ANION GAP SERPL CALC-SCNC: 14 MMOL/L — SIGNIFICANT CHANGE UP (ref 7–14)
AST SERPL-CCNC: 75 U/L — HIGH (ref 0–41)
BASOPHILS # BLD AUTO: 0.02 K/UL — SIGNIFICANT CHANGE UP (ref 0–0.2)
BASOPHILS NFR BLD AUTO: 0.4 % — SIGNIFICANT CHANGE UP (ref 0–1)
BILIRUB SERPL-MCNC: 0.7 MG/DL — SIGNIFICANT CHANGE UP (ref 0.2–1.2)
BUN SERPL-MCNC: 16 MG/DL — SIGNIFICANT CHANGE UP (ref 10–20)
CALCIUM SERPL-MCNC: 7.6 MG/DL — LOW (ref 8.4–10.5)
CHLORIDE SERPL-SCNC: 104 MMOL/L — SIGNIFICANT CHANGE UP (ref 98–110)
CHOLEST SERPL-MCNC: 156 MG/DL — SIGNIFICANT CHANGE UP
CO2 SERPL-SCNC: 22 MMOL/L — SIGNIFICANT CHANGE UP (ref 17–32)
CREAT SERPL-MCNC: 0.6 MG/DL — LOW (ref 0.7–1.5)
EGFR: 86 ML/MIN/1.73M2 — SIGNIFICANT CHANGE UP
EOSINOPHIL # BLD AUTO: 0.08 K/UL — SIGNIFICANT CHANGE UP (ref 0–0.7)
EOSINOPHIL NFR BLD AUTO: 1.7 % — SIGNIFICANT CHANGE UP (ref 0–8)
ESTIMATED AVERAGE GLUCOSE: 108 MG/DL — SIGNIFICANT CHANGE UP (ref 68–114)
GGT SERPL-CCNC: 84 U/L — HIGH (ref 1–40)
GLUCOSE SERPL-MCNC: 76 MG/DL — SIGNIFICANT CHANGE UP (ref 70–99)
HCT VFR BLD CALC: 30 % — LOW (ref 37–47)
HDLC SERPL-MCNC: 46 MG/DL — LOW
HGB BLD-MCNC: 10 G/DL — LOW (ref 12–16)
IMM GRANULOCYTES NFR BLD AUTO: 0.6 % — HIGH (ref 0.1–0.3)
LIPID PNL WITH DIRECT LDL SERPL: 86 MG/DL — SIGNIFICANT CHANGE UP
LYMPHOCYTES # BLD AUTO: 0.83 K/UL — LOW (ref 1.2–3.4)
LYMPHOCYTES # BLD AUTO: 17.8 % — LOW (ref 20.5–51.1)
MAGNESIUM SERPL-MCNC: 2.1 MG/DL — SIGNIFICANT CHANGE UP (ref 1.8–2.4)
MCHC RBC-ENTMCNC: 33.3 G/DL — SIGNIFICANT CHANGE UP (ref 32–37)
MCHC RBC-ENTMCNC: 36.9 PG — HIGH (ref 27–31)
MCV RBC AUTO: 110.7 FL — HIGH (ref 81–99)
MONOCYTES # BLD AUTO: 0.53 K/UL — SIGNIFICANT CHANGE UP (ref 0.1–0.6)
MONOCYTES NFR BLD AUTO: 11.3 % — HIGH (ref 1.7–9.3)
NEUTROPHILS # BLD AUTO: 3.18 K/UL — SIGNIFICANT CHANGE UP (ref 1.4–6.5)
NEUTROPHILS NFR BLD AUTO: 68.2 % — SIGNIFICANT CHANGE UP (ref 42.2–75.2)
NON HDL CHOLESTEROL: 110 MG/DL — SIGNIFICANT CHANGE UP
NRBC BLD AUTO-RTO: 0 /100 WBCS — SIGNIFICANT CHANGE UP (ref 0–0)
PLATELET # BLD AUTO: 226 K/UL — SIGNIFICANT CHANGE UP (ref 130–400)
PMV BLD: 10.3 FL — SIGNIFICANT CHANGE UP (ref 7.4–10.4)
POTASSIUM SERPL-MCNC: 4 MMOL/L — SIGNIFICANT CHANGE UP (ref 3.5–5)
POTASSIUM SERPL-SCNC: 4 MMOL/L — SIGNIFICANT CHANGE UP (ref 3.5–5)
PROT SERPL-MCNC: 5.4 G/DL — LOW (ref 6–8)
RBC # BLD: 2.71 M/UL — LOW (ref 4.2–5.4)
RBC # FLD: 13.7 % — SIGNIFICANT CHANGE UP (ref 11.5–14.5)
SODIUM SERPL-SCNC: 140 MMOL/L — SIGNIFICANT CHANGE UP (ref 135–146)
TRIGL SERPL-MCNC: 136 MG/DL — SIGNIFICANT CHANGE UP
TSH SERPL-MCNC: 1.75 UIU/ML — SIGNIFICANT CHANGE UP (ref 0.27–4.2)
WBC # BLD: 4.67 K/UL — LOW (ref 4.8–10.8)
WBC # FLD AUTO: 4.67 K/UL — LOW (ref 4.8–10.8)

## 2025-02-25 PROCEDURE — 99497 ADVNCD CARE PLAN 30 MIN: CPT | Mod: 25

## 2025-02-25 PROCEDURE — 99222 1ST HOSP IP/OBS MODERATE 55: CPT

## 2025-02-25 RX ORDER — ASPIRIN 325 MG
1 TABLET ORAL
Qty: 30 | Refills: 0
Start: 2025-02-25 | End: 2025-03-26

## 2025-02-25 RX ORDER — MELATONIN 5 MG
3 TABLET ORAL AT BEDTIME
Refills: 0 | Status: DISCONTINUED | OUTPATIENT
Start: 2025-02-25 | End: 2025-02-26

## 2025-02-25 RX ORDER — ACETAMINOPHEN 500 MG/5ML
650 LIQUID (ML) ORAL EVERY 6 HOURS
Refills: 0 | Status: DISCONTINUED | OUTPATIENT
Start: 2025-02-25 | End: 2025-02-26

## 2025-02-25 RX ORDER — MAGNESIUM, ALUMINUM HYDROXIDE 200-200 MG
30 TABLET,CHEWABLE ORAL EVERY 4 HOURS
Refills: 0 | Status: DISCONTINUED | OUTPATIENT
Start: 2025-02-25 | End: 2025-02-26

## 2025-02-25 RX ORDER — ATORVASTATIN CALCIUM 80 MG/1
10 TABLET, FILM COATED ORAL AT BEDTIME
Refills: 0 | Status: DISCONTINUED | OUTPATIENT
Start: 2025-02-25 | End: 2025-02-26

## 2025-02-25 RX ORDER — SODIUM CHLORIDE 9 G/1000ML
1000 INJECTION, SOLUTION INTRAVENOUS
Refills: 0 | Status: DISCONTINUED | OUTPATIENT
Start: 2025-02-25 | End: 2025-02-25

## 2025-02-25 RX ORDER — ONDANSETRON HCL/PF 4 MG/2 ML
4 VIAL (ML) INJECTION EVERY 6 HOURS
Refills: 0 | Status: DISCONTINUED | OUTPATIENT
Start: 2025-02-25 | End: 2025-02-26

## 2025-02-25 RX ORDER — ASPIRIN 325 MG
81 TABLET ORAL DAILY
Refills: 0 | Status: DISCONTINUED | OUTPATIENT
Start: 2025-02-25 | End: 2025-02-26

## 2025-02-25 RX ORDER — METOPROLOL SUCCINATE 50 MG/1
1 TABLET, EXTENDED RELEASE ORAL
Qty: 30 | Refills: 0
Start: 2025-02-25 | End: 2025-03-26

## 2025-02-25 RX ORDER — METOPROLOL SUCCINATE 50 MG/1
50 TABLET, EXTENDED RELEASE ORAL DAILY
Refills: 0 | Status: DISCONTINUED | OUTPATIENT
Start: 2025-02-25 | End: 2025-02-26

## 2025-02-25 RX ORDER — APIXABAN 2.5 MG/1
2.5 TABLET, FILM COATED ORAL EVERY 12 HOURS
Refills: 0 | Status: DISCONTINUED | OUTPATIENT
Start: 2025-02-25 | End: 2025-02-26

## 2025-02-25 RX ORDER — APIXABAN 2.5 MG/1
1 TABLET, FILM COATED ORAL
Qty: 60 | Refills: 0
Start: 2025-02-25 | End: 2025-03-26

## 2025-02-25 RX ADMIN — ATORVASTATIN CALCIUM 10 MILLIGRAM(S): 80 TABLET, FILM COATED ORAL at 22:16

## 2025-02-25 RX ADMIN — SODIUM CHLORIDE 65 MILLILITER(S): 9 INJECTION, SOLUTION INTRAVENOUS at 02:46

## 2025-02-25 RX ADMIN — APIXABAN 2.5 MILLIGRAM(S): 2.5 TABLET, FILM COATED ORAL at 17:08

## 2025-02-25 RX ADMIN — Medication 30 MILLILITER(S): at 10:56

## 2025-02-25 RX ADMIN — APIXABAN 2.5 MILLIGRAM(S): 2.5 TABLET, FILM COATED ORAL at 06:31

## 2025-02-25 RX ADMIN — METOPROLOL SUCCINATE 50 MILLIGRAM(S): 50 TABLET, EXTENDED RELEASE ORAL at 06:31

## 2025-02-25 RX ADMIN — Medication 81 MILLIGRAM(S): at 11:54

## 2025-02-25 NOTE — DISCHARGE NOTE PROVIDER - HOSPITAL COURSE
89-year-old female past medical history of hyperlipidemia, anemia, and thrombocytosis on ASA presents to ED  from home with palpitations for 4 hours with left lower chest pain without radiation. She rated the pain a 3/10, now currently without pain. She has had abd pain with vomiting since last Friday when she ate raw fish at a [employer] restaurant. She has also been experiencing constipation during the this time. Denies alcohol use, excessive caffeine use, no new meds/food,  headache, LOC, trauma, fever, diarrhea, or chills. In the ED her EKG showed Afib with RVR, CTAP was negative, LFTs were elevated, Hepatitis A w/u was ordered. Also Troponin was 25=?26. No concern for ACS. TTE was performed.     #Afib RVR on admission 2/2 infection vs r/o cardiac etiology  -[id number]:3  - s/p cardizem  - Home med: metoprolol succinate XL 50 daily and aspirin 81 mg nightly  - Rate controlled  - Continue home meds  - Continue Eliquis 2.5 mg BID ( Age >81 yo and Wt < 60 kg)  - F/u TSH    #Abd pain  #N/V  #Constipation  -#Transaminitis  - elevated AST and ALT, hepatocellular pattern, fu on HAV w/u  - CT A/P w/ IV Contrast: Colonic diverticulosis; no evidence of acute abdominal pathology  - IV Zofran PRN     #Hyperlipidemia  - Home med: Simvastatin 20 mg qnightly  - F/u lipid panel and hemoglobin A1C  -cw home meds    #Hx of anemia  - Hb.2 89-year-old female past medical history of hyperlipidemia, anemia, and thrombocytosis on ASA presents to ED  from home with palpitations for 4 hours with left lower chest pain without radiation. She rated the pain a 3/10, now currently without pain. She has had abd pain with vomiting since last Friday when she ate raw fish at a [employer] restaurant. She has also been experiencing constipation during the this time. Denies alcohol use, excessive caffeine use, no new meds/food,  headache, LOC, trauma, fever, diarrhea, or chills. In the ED her EKG showed Afib with RVR, CTAP was negative, LFTs were elevated, Hepatitis A w/u was ordered. Also Troponin was 25=?26. No concern for ACS. TTE was performed.     #Afib RVR on admission 2/2 infection vs r/o cardiac etiology  -[id number]:3  - s/p cardizem  - Home med: metoprolol succinate XL 50 daily  - DC'd aspirin; thrombocytosis is resolved and was temporary, possibly reactive in the past  - Rate controlled  - Continue Eliquis 2.5 mg BID ( Age >81 yo and Wt < 60 kg)  - TSH wnl  - TTE 2025; EF 63%, G2DD, Severe TR and severe pHTN  - OP follow up     #Abd pain  #N/V  #Constipation  -#Transaminitis  - elevated AST and ALT, hepatocellular pattern, fu on Hepatitis w/u  - LFTs downtrending, holding statin  - CT A/P w/ IV Contrast: Colonic diverticulosis; no evidence of acute abdominal pathology  - IV Zofran PRN     #Hyperlipidemia  - Home med: Simvastatin 20 mg qnightly  - F/u lipid panel and hemoglobin A1C  - home meds    #Hx of anemia  - Hb.2    Discussion of discharge plan of care, including discharge diagnoses, medication reconciliation, and follow-ups was conducted with Dr. Richard Ridley on 2025, and discharge was approved.

## 2025-02-25 NOTE — DISCHARGE NOTE PROVIDER - CARE PROVIDER_API CALL
[place], [facility]  Cardiology  [address] [place] [address], [address]  [place] [address]  Phone: [phone]  Fax: [phone]  Follow Up Time: 1 week    Jose Daniel Abraham  [facility] Medicine  [address] [facility], [address]  [place] [address]  Phone: ()-  Fax: ()-  Follow Up Time: 1 week

## 2025-02-25 NOTE — H&P ADULT - ASSESSMENT
89-year-old female past medical history of hyperlipidemia, anemia, and thrombocytosis on ASA presents to ED  from home with palpitations for 4 hours. She has had abd pain with vomiting since last Friday when she ate raw fish at a [employer].    ** Please confirm med rec with pharmacy in AM***    #Afib RVR on admission 2/2 infection vs r/o cardiac etiology  -[id number]:3  - s/p cardizem  - Home med: metoprolol succinate XL 50 qdaily and aspirin 81 mg qnightly  - Continue home meds  - Continue Eliquis 2.5 mg BID ( Age >81 yo and Wt < 60 kg)  - F/u TSH and mag  - F/u Echo    #Abd pain  #N/V  #Constipation  -#Transaminitis  - Alk phos: 184, AST:175 ALT:476 ( Hemolyzed)  - CT A/P w/ IV Contrast: Colonic diverticulosis; no evidence of acute abdominal pathology  - IV Zofran PRN   - Start maintenance fluids  - F/u GGT    #Hyperlipidemia  - Home med: Simvastatin 20 mg qnightly  - F/u lipid panel and hemoglobin A1C  - Start atorvastatin 10 mg qnightly    #Hx of thrombocytosis  - Plt:256  - Monitor    #Hx of anemia  - Hb.2      -DVT prophylaxis: on apixaban  -GI prophylaxis: None  -Diet: DASH/CC  -Code status: Full code  -Activity: IAT  -Dispo: 3C     89-year-old female past medical history of hyperlipidemia, anemia, and thrombocytosis on ASA presents to ED  from home with palpitations for 4 hours. She has had abd pain with vomiting since last Friday when she ate raw fish at a [employer].    *** Please confirm med rec with pharmacy in AM***    #Afib RVR on admission 2/2 infection vs r/o cardiac etiology  -[id number]:3  - s/p cardizem  - Home med: metoprolol succinate XL 50 qdaily and aspirin 81 mg qnightly  - Continue home meds  - Continue Eliquis 2.5 mg BID ( Age >81 yo and Wt < 60 kg)  - F/u TSH and mag  - F/u Echo    #Abd pain  #N/V  #Constipation  -#Transaminitis  - Alk phos: 184, AST:175 ALT:476 ( Hemolyzed)  - CT A/P w/ IV Contrast: Colonic diverticulosis; no evidence of acute abdominal pathology  - IV Zofran PRN   - Start maintenance fluids  - F/u GGT    #Hyperlipidemia  - Home med: Simvastatin 20 mg qnightly  - F/u lipid panel and hemoglobin A1C  - Start atorvastatin 10 mg qnightly    #Hx of thrombocytosis  - Plt:256  - Monitor    #Hx of anemia  - Hb.2    -DVT prophylaxis: on apixaban  -GI prophylaxis: None  -Diet: DASH/CC  -Code status: Full code  -Activity: IAT  -Dispo: 3C

## 2025-02-25 NOTE — DISCHARGE NOTE PROVIDER - NSDCMRMEDTOKEN_GEN_ALL_CORE_FT
Adult Aspirin 81 mg oral tablet, chewable: 1 tab(s) chewed once a day  apixaban 2.5 mg oral tablet: 1 tab(s) orally every 12 hours  hydroxyurea 500 mg oral capsule: 15 mg/kg orally once a day  metoprolol succinate 50 mg oral tablet, extended release: 1 tab(s) orally once a day  simvastatin 20 mg oral tablet: 1 tab(s) orally once a day (at bedtime)   aspirin 81 mg oral capsule: 1 cap(s) orally once a day  Eliquis 2.5 mg oral tablet: 1 tab(s) orally 2 times a day  hydroxyurea 500 mg oral capsule: 15 mg/kg orally once a day  metoprolol succinate 50 mg oral capsule, extended release: 1 cap(s) orally once a day  simvastatin 20 mg oral tablet: 1 tab(s) orally once a day (at bedtime)   calcium carbonate 1250 mg (500 mg elemental calcium) oral tablet: 1 tab(s) orally 2 times a day  Eliquis 2.5 mg oral tablet: 1 tab(s) orally 2 times a day  hydroxyurea 500 mg oral capsule: 15 mg/kg orally once a day  metoprolol succinate 50 mg oral capsule, extended release: 1 cap(s) orally once a day   calcium carbonate 1250 mg (500 mg elemental calcium) oral tablet: 1 tab(s) orally 2 times a day  cholecalciferol 50 mcg (2000 intl units) oral tablet: 1 tab(s) orally once a day  Eliquis 2.5 mg oral tablet: 1 tab(s) orally 2 times a day  ergocalciferol 50 mcg (2000 intl units) oral capsule: 1 cap(s) orally once a day  hydroxyurea 500 mg oral capsule: 15 mg/kg orally once a day  metoprolol succinate 50 mg oral capsule, extended release: 1 cap(s) orally once a day   calcium carbonate 1250 mg (500 mg elemental calcium) oral tablet: 1 tab(s) orally 2 times a day  Eliquis 2.5 mg oral tablet: 1 tab(s) orally 2 times a day  ergocalciferol 50 mcg (2000 intl units) oral capsule: 1 cap(s) orally once a day  hydroxyurea 500 mg oral capsule: 15 mg/kg orally once a day  metoprolol succinate 50 mg oral capsule, extended release: 1 cap(s) orally once a day

## 2025-02-25 NOTE — H&P ADULT - HISTORY OF PRESENT ILLNESS
89-year-old female past medical history of hyperlipidemia, anemia, and thrombocytosis on ASA presents to ED  from home with palpitations for 4 hours with left lower chest pain without radiation. She rated the pain a 3/10, now currently without pain. She has had abd pain with vomiting since last Friday when she ate raw fish at a [employer] restaurant. She has also been experiencing constipation during the this time. Denies alcohol use, excessive caffeine use, no new meds/food,  headache, LOC, trauma, fever, diarrhea, or chills.    In the ED,   Vitals: Afebrile,  bp 99/60 RR 16 SPO2:98% on RA  Labs: WBC:5.11 Hb.2 Trop 26 repeat 26, Cre:0.9 Alk phosphatase 184 AST: 175 ALT:476 Lactate: 2    EKG: Afib RVR  Imaging: CT A/P w/ IV Contrast: No evidence of acute abdominal pathology    s/p cardizem 10 IV push( now in sinus rhythm), 1L lR Bolus and admitted to telemetry   89-year-old female past medical history of hyperlipidemia, anemia, and thrombocytosis on ASA presents to ED  from home with palpitations for 4 hours with left lower chest pain without radiation. She rated the pain a 3/10, now currently without pain. She has had abd pain with vomiting since last Friday when she ate raw fish at a [employer] restaurant. She has also been experiencing constipation during the this time. Denies alcohol use, excessive caffeine use, no new meds/food,  headache, LOC, trauma, fever, diarrhea, or chills.    In the ED,   Vitals: Afebrile,  bp 99/60 RR 16 SPO2:98% on RA  Labs: WBC:5.11 Hb.2 Trop 26 repeat 26, Cre:0.9 Alk phosphatase 184 AST: 175 ALT:476 Lactate: 2  VBG: pH:7.31 pCO2:47 pO2:23 O2 sat: 29.3    EKG: Afib RVR  Imaging: CT A/P w/ IV Contrast: No evidence of acute abdominal pathology    s/p Cardizem 10 IV push( now in sinus rhythm), 1L lR Bolus and admitted to telemetry

## 2025-02-25 NOTE — DISCHARGE NOTE PROVIDER - PROVIDER TOKENS
PROVIDER:[TOKEN:[[id number]],FOLLOWUP:[1 week]],PROVIDER:[TOKEN:[[id number]],FOLLOWUP:[1 week]]

## 2025-02-25 NOTE — H&P ADULT - NSHPLABSRESULTS_GEN_ALL_CORE
WBC:5.11 Hb.2 Trop 26 repeat 26, Cre:0.9 Alk phosphatase 184 AST: 175 ALT:476 Lactate: 2  EKG: Afib RVR  CT A/P w/ IV Contrast: No evidence of acute abdominal pathology WBC:5.11 Hb.2 Trop 26 repeat 26, Cre:0.9 Alk phosphatase 184 AST: 175 ALT:476 Lactate: 2  VBG: pH:7.31 pCO2:47 pO2:23 O2 sat: 29.3  EKG: Afib RVR  CT A/P w/ IV Contrast: No evidence of acute abdominal pathology

## 2025-02-25 NOTE — DISCHARGE NOTE PROVIDER - NSDCCPCAREPLAN_GEN_ALL_CORE_FT
PRINCIPAL DISCHARGE DIAGNOSIS  Diagnosis: Atrial fibrillation  Assessment and Plan of Treatment: You came in today because your heart was racing and you had some mild pain in your chest, which thankfully has gone away now. You've been feeling unwell since Friday after eating raw fish, experiencing vomiting, stomach pain, and constipation.  We know you have a history of high cholesterol, a slightly low red blood cell count, and a high platelet count, and that you take a daily aspirin. Because of your symptoms, we did an EKG, which showed your heart is beating irregularly and a bit fast – this is called atrial fibrillation. Your chest CT scan was normal, which is good news. Your liver tests were a little high, so we've ordered a Hepatitis A test to be safe, given you mentioned the raw fish. Your troponin level, which checks for heart damage, was a little elevated, but we don't think you're having a heart attack right now. We performed an ultrasound of your heart to get a clearer picture of what's going on. Your heart rate is controlled, and you are ready for discharge and you need to follow up outpatient with cardiology and primary care.     PRINCIPAL DISCHARGE DIAGNOSIS  Diagnosis: Atrial fibrillation  Assessment and Plan of Treatment: You came to the hospital for palpitations this was attributed to an exacerbation to your known condition of atrial fibrillation. This exacerbation may have been triggered by an infection of your stomach which has resolved now and your heart rate is well controlled. Your echo/heart ultrasound showed hypertension in your lung vessels and valve disease. We recommend you to follow up with a cardiologist as an outpatient. Please follow up with your PCP routinely.      SECONDARY DISCHARGE DIAGNOSES  Diagnosis: Abnormal transaminases  Assessment and Plan of Treatment: Your liver enzymes were elevated which is most likely due to an infection, nothing in your lab work is concerning and these conditions are usually self limited. We did hold your cholesterol medication for now and you need to follow up with your PCP to restart it. We also stopped your aspirin as the condition you were taking it for is now resolved.

## 2025-02-25 NOTE — H&P ADULT - NSHPPHYSICALEXAM_GEN_ALL_CORE
T(C): 36.8 (02-24-25 @ 23:59), Max: 37.6 (02-24-25 @ 18:05)  HR: 82 (02-24-25 @ 23:59) (81 - 146)  BP: 108/62 (02-24-25 @ 23:59) (86/62 - 118/57)  RR: 18 (02-24-25 @ 23:59) (16 - 18)  SpO2: 99% (02-24-25 @ 23:59) (98% - 99%)    CONSTITUTIONAL: Well groomed, no apparent distress  EYES: EOMI, No conjunctival or scleral injection, non-icteric  ENMT: Oral mucosa with moist membranes. Normal dentition; no pharyngeal injection or exudates  NECK: Supple, symmetric and without tracheal deviation   RESP: No respiratory distress, no use of accessory muscles; CTA b/l, no WRR  CV: RRR, +S1S2; no peripheral edema  GI: Soft, NT, ND, no rebound, no guarding; no palpable albert  MSK: Normal gait; No digital clubbing or cyanosis; examination of the (head/neck/spine/ribs/pelvis, RUE, LUE, RLE, LLE) without misalignment, Normal ROM without pain, no spinal tenderness, normal muscle strength/tone  SKIN: No rashes or ulcers noted  NEURO: Grossly intact  PSYCH: Appropriate insight/judgment; A+O x 3, mood and affect appropriate, recent/remote memory intact

## 2025-02-25 NOTE — H&P ADULT - ADVANCED CARE PLANNING
----- Message from Anastasiia Vidal [phone]   Voluntary discussion occurred addressing advanced care planning

## 2025-02-25 NOTE — DISCHARGE NOTE PROVIDER - DISCHARGE SERVICE FOR PATIENT
WDL
on the discharge service for the patient. I have reviewed and made amendments to the documentation where necessary.

## 2025-02-25 NOTE — H&P ADULT - ATTENDING COMMENTS
89-year-old female past medical history of hyperlipidemia, anemia, and thrombocytosis on ASA presents to ED  from home with palpitations for 4 hours. She has had abd pain with vomiting since last Friday when she ate raw fish at a [employer].    GENERAL: NAD, lying in bed comfortably  CHEST/LUNG: Clear to auscultation bilaterally; No rales, rhonchi, wheezing, or rubs. Unlabored respirations  HEART: Regular rate and rhythm; systolic murmurs, rubs, or gallops  ABDOMEN: Bowel sounds present; Soft, Nontender, Nondistended. No hepatomegally  EXTREMITIES: no edema. gait unsteady  NERVOUS SYSTEM:  Alert & Oriented X3, speech clear. No deficits        #Afib RVR on admission 2/2 infection vs r/o cardiac etiology  -[id number]:3  - s/p cardizem  - Home med: metoprolol succinate XL 50 qdaily and aspirin 81 mg qnightly  - Continue home meds  - Continue Eliquis 2.5 mg BID ( Age >81 yo and Wt < 60 kg)  - F/u TSH and mag  - F/u Echo    #Abd pain  #N/V  #Constipation  -#Transaminitis  - Alk phos: 184, AST:175 ALT:476 ( Hemolyzed)  - CT A/P w/ IV Contrast: Colonic diverticulosis; no evidence of acute abdominal pathology  dc fluids  LFT improving     #Hyperlipidemia  - Home med: Simvastatin 20 mg qnightly  - F/u lipid panel and hemoglobin A1C  - Start atorvastatin 10 mg qnightly    #Hx of thrombocytosis  - Plt:256  - Monitor    #Hx of anemia  - Hbg: 10 likely diluted after fluids  - monitor. no overt signs of bleeding.     -DVT prophylaxis: on apixaban  -GI prophylaxis: None  -Diet: DASH/CC  -Code status: Full code  -Activity: IAT  -Dispo: dc tele. Patient was planned for dc today depending on echo and pt eval. both not complete as of yet. Anticipate.

## 2025-02-25 NOTE — H&P ADULT - CONVERSATION DETAILS
Introduced concept of goals of care with patient at bedside. Discussed concepts of full medical management, limited medical care, and comfort measures. Explained concept of "full code" including need for compressions if patient were to arrest and need for intubation if patient experiencing severe respiratory distress or inability to protect airway. Additionally, explained concepts of DNR and DNI to family as well. Pt both expressed understanding of all concepts. At this time, she would like patient to be full code.

## 2025-02-26 ENCOUNTER — RESULT REVIEW (OUTPATIENT)
Age: 89
End: 2025-02-26

## 2025-02-26 ENCOUNTER — INPATIENT (INPATIENT)
Facility: HOSPITAL | Age: 89
LOS: 1 days | Discharge: HOME | DRG: 446 | End: 2025-02-28
Attending: STUDENT IN AN ORGANIZED HEALTH CARE EDUCATION/TRAINING PROGRAM | Admitting: STUDENT IN AN ORGANIZED HEALTH CARE EDUCATION/TRAINING PROGRAM
Payer: MEDICARE

## 2025-02-26 VITALS
TEMPERATURE: 98 F | SYSTOLIC BLOOD PRESSURE: 130 MMHG | OXYGEN SATURATION: 99 % | WEIGHT: 87.96 LBS | DIASTOLIC BLOOD PRESSURE: 82 MMHG | HEART RATE: 75 BPM | RESPIRATION RATE: 18 BRPM

## 2025-02-26 VITALS
HEART RATE: 85 BPM | DIASTOLIC BLOOD PRESSURE: 75 MMHG | SYSTOLIC BLOOD PRESSURE: 151 MMHG | OXYGEN SATURATION: 97 % | RESPIRATION RATE: 18 BRPM | TEMPERATURE: 98 F

## 2025-02-26 LAB
24R-OH-CALCIDIOL SERPL-MCNC: 51 NG/ML — SIGNIFICANT CHANGE UP (ref 30–80)
ALBUMIN SERPL ELPH-MCNC: 3.2 G/DL — LOW (ref 3.5–5.2)
ALP SERPL-CCNC: 313 U/L — HIGH (ref 30–115)
ALT FLD-CCNC: 224 U/L — HIGH (ref 0–41)
ANION GAP SERPL CALC-SCNC: 10 MMOL/L — SIGNIFICANT CHANGE UP (ref 7–14)
AST SERPL-CCNC: 86 U/L — HIGH (ref 0–41)
BASOPHILS # BLD AUTO: 0.02 K/UL — SIGNIFICANT CHANGE UP (ref 0–0.2)
BASOPHILS NFR BLD AUTO: 0.5 % — SIGNIFICANT CHANGE UP (ref 0–1)
BILIRUB SERPL-MCNC: 0.6 MG/DL — SIGNIFICANT CHANGE UP (ref 0.2–1.2)
BUN SERPL-MCNC: 12 MG/DL — SIGNIFICANT CHANGE UP (ref 10–20)
CALCIUM SERPL-MCNC: 7.3 MG/DL — LOW (ref 8.4–10.5)
CHLORIDE SERPL-SCNC: 103 MMOL/L — SIGNIFICANT CHANGE UP (ref 98–110)
CO2 SERPL-SCNC: 26 MMOL/L — SIGNIFICANT CHANGE UP (ref 17–32)
CREAT SERPL-MCNC: 0.5 MG/DL — LOW (ref 0.7–1.5)
EGFR: 90 ML/MIN/1.73M2 — SIGNIFICANT CHANGE UP
EOSINOPHIL # BLD AUTO: 0.11 K/UL — SIGNIFICANT CHANGE UP (ref 0–0.7)
EOSINOPHIL NFR BLD AUTO: 2.9 % — SIGNIFICANT CHANGE UP (ref 0–8)
GLUCOSE SERPL-MCNC: 73 MG/DL — SIGNIFICANT CHANGE UP (ref 70–99)
HAV IGG SER QL IA: REACTIVE
HAV IGM SER-ACNC: SIGNIFICANT CHANGE UP
HCT VFR BLD CALC: 29.2 % — LOW (ref 37–47)
HGB BLD-MCNC: 9.6 G/DL — LOW (ref 12–16)
IMM GRANULOCYTES NFR BLD AUTO: 0.3 % — SIGNIFICANT CHANGE UP (ref 0.1–0.3)
LYMPHOCYTES # BLD AUTO: 0.7 K/UL — LOW (ref 1.2–3.4)
LYMPHOCYTES # BLD AUTO: 18.4 % — LOW (ref 20.5–51.1)
MAGNESIUM SERPL-MCNC: 2.1 MG/DL — SIGNIFICANT CHANGE UP (ref 1.8–2.4)
MCHC RBC-ENTMCNC: 32.9 G/DL — SIGNIFICANT CHANGE UP (ref 32–37)
MCHC RBC-ENTMCNC: 36.2 PG — HIGH (ref 27–31)
MCV RBC AUTO: 110.2 FL — HIGH (ref 81–99)
MONOCYTES # BLD AUTO: 0.59 K/UL — SIGNIFICANT CHANGE UP (ref 0.1–0.6)
MONOCYTES NFR BLD AUTO: 15.5 % — HIGH (ref 1.7–9.3)
NEUTROPHILS # BLD AUTO: 2.38 K/UL — SIGNIFICANT CHANGE UP (ref 1.4–6.5)
NEUTROPHILS NFR BLD AUTO: 62.4 % — SIGNIFICANT CHANGE UP (ref 42.2–75.2)
NRBC BLD AUTO-RTO: 0 /100 WBCS — SIGNIFICANT CHANGE UP (ref 0–0)
PLATELET # BLD AUTO: 222 K/UL — SIGNIFICANT CHANGE UP (ref 130–400)
PMV BLD: 10.4 FL — SIGNIFICANT CHANGE UP (ref 7.4–10.4)
POTASSIUM SERPL-MCNC: 3.8 MMOL/L — SIGNIFICANT CHANGE UP (ref 3.5–5)
POTASSIUM SERPL-SCNC: 3.8 MMOL/L — SIGNIFICANT CHANGE UP (ref 3.5–5)
PROT SERPL-MCNC: 5.1 G/DL — LOW (ref 6–8)
RBC # BLD: 2.65 M/UL — LOW (ref 4.2–5.4)
RBC # FLD: 13.4 % — SIGNIFICANT CHANGE UP (ref 11.5–14.5)
SODIUM SERPL-SCNC: 139 MMOL/L — SIGNIFICANT CHANGE UP (ref 135–146)
WBC # BLD: 3.81 K/UL — LOW (ref 4.8–10.8)
WBC # FLD AUTO: 3.81 K/UL — LOW (ref 4.8–10.8)

## 2025-02-26 PROCEDURE — 99239 HOSP IP/OBS DSCHRG MGMT >30: CPT

## 2025-02-26 PROCEDURE — 93306 TTE W/DOPPLER COMPLETE: CPT | Mod: 26

## 2025-02-26 PROCEDURE — 99285 EMERGENCY DEPT VISIT HI MDM: CPT

## 2025-02-26 RX ORDER — ERGOCALCIFEROL 1.25 MG/1
1 CAPSULE ORAL
Qty: 30 | Refills: 0
Start: 2025-02-26 | End: 2025-03-27

## 2025-02-26 RX ORDER — CALCIUM CARBONATE 750 MG/1
1 TABLET ORAL
Qty: 60 | Refills: 0
Start: 2025-02-26 | End: 2025-03-27

## 2025-02-26 RX ORDER — CALCIUM CARBONATE 750 MG/1
1 TABLET ORAL
Refills: 0 | Status: DISCONTINUED | OUTPATIENT
Start: 2025-02-26 | End: 2025-02-26

## 2025-02-26 RX ADMIN — Medication 40 MILLIGRAM(S): at 05:55

## 2025-02-26 RX ADMIN — APIXABAN 2.5 MILLIGRAM(S): 2.5 TABLET, FILM COATED ORAL at 05:55

## 2025-02-26 RX ADMIN — METOPROLOL SUCCINATE 50 MILLIGRAM(S): 50 TABLET, EXTENDED RELEASE ORAL at 05:55

## 2025-02-26 NOTE — PHYSICAL THERAPY INITIAL EVALUATION ADULT - PERTINENT HX OF CURRENT PROBLEM, REHAB EVAL
89-year-old female past medical history of hyperlipidemia, anemia, and thrombocytosis on ASA presents to ED  from home with palpitations for 4 hours with left lower chest pain without radiation. She rated the pain a 3/10, now currently without pain. She has had abd pain with vomiting since last Friday when she ate raw fish at a [employer] restaurant. She has also been experiencing constipation during the this time. Denies alcohol use, excessive caffeine use, no new meds/food,  headache, LOC, trauma, fever, diarrhea, or chills.  Pt. referred to PT for eval and tx.

## 2025-02-26 NOTE — PHYSICAL THERAPY INITIAL EVALUATION ADULT - GENERAL OBSERVATIONS, REHAB EVAL
[id number] am. Pt was found semireclined in bed. AOx3. Pt +IV. Pt was able to participate in therapy evaluation session today. Pt was returned to bed following session semireclined with bed alarm put on and call bell in reach.

## 2025-02-26 NOTE — PHYSICAL THERAPY INITIAL EVALUATION ADULT - IMPAIRMENTS CONTRIBUTING TO GAIT DEVIATIONS, PT EVAL
pt needed supervision/contact guard due to dizziness.. Following ambulation, BP was taken and read 151/83. RN was notified./decreased strength

## 2025-02-26 NOTE — PROGRESS NOTE ADULT - SUBJECTIVE AND OBJECTIVE BOX
MANDO YOUNG  89y  Female  ***My note supersedes ALL resident notes that I sign.  My corrections for their notes are in my note.***    I can be reached directly via Teams or my office number is [phone] or [phone].    INTERVAL EVENTS: Here for f/u of afib. Pt feels OK. No more palp. No SOB. Pt can walk w/ walker on her own. Pt would like to go home. Family are her CDPAP.    T(F): 97.9 (02-26-25 @ 12:04), Max: 98.1 (02-25-25 @ 21:37)  HR: 85 (02-26-25 @ 12:04) (78 - 85)  BP: 151/75 (02-26-25 @ 12:04) (124/70 - 151/75)  RR: 18 (02-26-25 @ 12:04) (18 - 18)  SpO2: 97% (02-26-25 @ 12:04) (97% - 98%)    Gen: NAD  HEENT: PERRL, EOMI, mouth clr, nose clr  Neck: no nodes, no JVD, thyroid nl  lungs: clr; no crackles  hrt: s1 s2 irreg, nl rate, no murmur  abd: soft, NT/ND, no HS megaly  ext: no edema, no c/c  neuro: aa ox3, cn intact, can move all 4 ext    LABS:                      9.6     (    110.2  3.81  )-----------( ---------      222      ( 26 Feb 2025 05:33 )             29.2    (    13.4     139   (   103   (   73      02-26-25 @ 05:33  ----------------------               3.8   (   26   (   12                             -----                        0.5  Ca  7.3 = mark 8  Mg  2.1    P   --     LFT  5.1  (  0.6  (  86       02-26-25 @ 05:33  -------------------------  3.2  (  313  (  224    Alb 3.2  T floyd 0.6     AST 86    02-26-25 @ 05:33 AP worse; transaminases better  Alb 3.3  T floyd 0.7     AST 75    02-25-25 @ 08:15  Alb 4.4  T floyd 0.9         02-24-25 @ 18:10    Urinalysis Basic - ( 26 Feb 2025 05:33 )    Color: x / Appearance: x / SG: x / pH: x  Gluc: 73 mg/dL / Ketone: x  / Bili: x / Urobili: x   Blood: x / Protein: x / Nitrite: x   Leuk Esterase: x / RBC: x / WBC x   Sq Epi: x / Non Sq Epi: x / Bacteria: x    RADIOLOGY & ADDITIONAL TESTS:  < from: CT Abdomen and Pelvis w/ IV Cont (02.24.25 @ 19:46) >  No evidence of acute abdominal pathology.    < end of copied text >    < from: Xray Chest 1 View- PORTABLE-Urgent (02.24.25 @ 18:59) >  Support devices: None.    Cardiac/mediastinum/hilum: No significant change    Skeleton/soft tissues: No significant change.    Lung parenchyma/Pleura: There is no evidence of focal consolidation,   pleural effusion or pneumothorax.    < end of copied text >    < from: TTE Echo Complete w/o Contrast w/ Doppler (02.26.25 @ 08:07) >   1. Normal global left ventricular systolic function with a biplane  EF of 63%. Moderate (grade 2) diastolic dysfunction. No regional wall motion abnormalities.   2. Normal right ventricular size and function.   3. Mildly enlarged left atrium.   4. Normal right atrial size.   5. Posterior mitral leaflet prolapse with severe anteriorly directed mitral regurgitation.   6. Severe tricuspid regurgitation.   7. Sclerotic aortic valve with normal opening and mild to moderate aortic regurgitation.   8. Severe pulmonary hypertension. The IVC is normal in size with >50% respiratory variability.   9. There is no evidence of pericardial effusion.    < end of copied text >    MEDICATIONS:    acetaminophen     Tablet .. 650 milliGRAM(s) Oral every 6 hours PRN  aluminum hydroxide/magnesium hydroxide/simethicone Suspension 30 milliLiter(s) Oral every 4 hours PRN  apixaban 2.5 milliGRAM(s) Oral every 12 hours  calcium carbonate   1250 mG (OsCal) 1 Tablet(s) Oral two times a day  melatonin 3 milliGRAM(s) Oral at bedtime PRN  metoprolol succinate ER 50 milliGRAM(s) Oral daily  ondansetron Injectable 4 milliGRAM(s) IV Push every 6 hours PRN  pantoprazole    Tablet 40 milliGRAM(s) Oral before breakfast

## 2025-02-26 NOTE — ED ADULT TRIAGE NOTE - RESPIRATORY RATE (BREATHS/MIN)
No changes in H&P, ASA 2, Airway class  2.  The plans for this patient is for moderate sedation with benzodiazepine namely midazolam and opioid namely fentany. The patient's history and physical has been documented and reviewed. The patient is suitable to undertake sedation. The risks , benefits as well as alternatives have been discussed with the patient/decision-maker.             Electronically Signed On 10.23.2018 14:22  ___________________________________________________   Noah ALANIZ, Steve Espino     18

## 2025-02-26 NOTE — PHYSICAL THERAPY INITIAL EVALUATION ADULT - BALANCE TRAINING, PT EVAL
Pt will be able to improve standing dynamic balance in order to ensure stability and reduce risk of falls.

## 2025-02-26 NOTE — PHYSICAL THERAPY INITIAL EVALUATION ADULT - GAIT TRAINING, PT EVAL
Pt will be able to ambulate independently 150 ft with use of rolling walker by discharge in order to ambulate in community and apartment without difficulty.

## 2025-02-26 NOTE — PHYSICAL THERAPY INITIAL EVALUATION ADULT - NSACTIVITYREC_GEN_A_PT
Pt was instructed to continue with exercises at home to maintain mobility and strength, preparing for outpatient PT. Pt was instructed to participate in out of bed activities to participate in home chores and ADLs.

## 2025-02-26 NOTE — PROGRESS NOTE ADULT - ASSESSMENT
89-year-old woman w/ past medical history of hyperlipidemia, anemia, and thrombocytosis on ASA presents to ED from home with palpitations for 4 hours. She has had abd pain with vomiting since last Friday when she ate raw fish at a [employer] restaurant.    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    # Afib RVR on admission 2/2 chr HFpEF and sev pHTN 2/2 sev valve dz (sev MR, sev TR, mod AR)  -[id number]  - s/p cardizem  c/w metoprolol succinate XL 50 qdaily  d/c aspirin - pt on DOAC  Continue Eliquis 2.5 mg BID ( Age >79 yo and Wt < 60 kg)  TSH 1.75  K, Mg nl  Ca low  Echo: EF 63%; G2DD; no WMA; mild LAE; nl RV fxn; MVP w/ sev MR; sev TR; mild-mod AR; sev pHTN  outpt cardio f/u  d/c tele    # hypocalcemia likely from Vit D Def  check Vit D 25 OH - can f/u outpt  CaCO3 1250mg po q12  Vit D 2000 IU po q24  outpt f/u    # Transaminitis 2/2 passive congestion from heart/pHTN  CT A/P: no cirrhosis  HOLD statin and f/u w/ PMD  GGT also elevated    # Abd pain; N/V; Constipation  CT A/P w/ IV Contrast: Colonic diverticulosis; no evidence of acute abdominal pathology  IV Zofran PRN   resolved and pt mana diet    # Hyperlipidemia  HOLD Simvastatin 20 mg qnightly til LFTs better  F/u lipid panel and hemoglobin A1C as outpt    # Hx of thrombocytosis  - Plt: 256 - d/c asa    # Hx of macro anemia  outpt heme eval    # DVT prophylaxis: on apixaban    # GI prophylaxis: ppi po q24    # Code status: Full code    # Activity: ambulates w/ walker on her own; steady    # I spoke w/ her  on the phone    Dispo: d/c home today

## 2025-02-27 DIAGNOSIS — K80.20 CALCULUS OF GALLBLADDER WITHOUT CHOLECYSTITIS WITHOUT OBSTRUCTION: ICD-10-CM

## 2025-02-27 LAB
ALBUMIN SERPL ELPH-MCNC: 3.4 G/DL — LOW (ref 3.5–5.2)
ALBUMIN SERPL ELPH-MCNC: 3.7 G/DL — SIGNIFICANT CHANGE UP (ref 3.5–5.2)
ALP SERPL-CCNC: 479 U/L — HIGH (ref 30–115)
ALP SERPL-CCNC: 520 U/L — HIGH (ref 30–115)
ALT FLD-CCNC: 261 U/L — HIGH (ref 0–41)
ALT FLD-CCNC: 273 U/L — HIGH (ref 0–41)
AMORPH CRY # UR COMP ASSIST: PRESENT
ANION GAP SERPL CALC-SCNC: 12 MMOL/L — SIGNIFICANT CHANGE UP (ref 7–14)
ANION GAP SERPL CALC-SCNC: 16 MMOL/L — HIGH (ref 7–14)
APPEARANCE UR: ABNORMAL
APTT BLD: 32.3 SEC — SIGNIFICANT CHANGE UP (ref 27–39.2)
APTT BLD: 52.6 SEC — HIGH (ref 27–39.2)
AST SERPL-CCNC: 210 U/L — HIGH (ref 0–41)
AST SERPL-CCNC: 233 U/L — HIGH (ref 0–41)
BACTERIA # UR AUTO: ABNORMAL /HPF
BASOPHILS # BLD AUTO: 0.01 K/UL — SIGNIFICANT CHANGE UP (ref 0–0.2)
BASOPHILS NFR BLD AUTO: 0.2 % — SIGNIFICANT CHANGE UP (ref 0–1)
BILIRUB SERPL-MCNC: 2 MG/DL — HIGH (ref 0.2–1.2)
BILIRUB SERPL-MCNC: 2.4 MG/DL — HIGH (ref 0.2–1.2)
BILIRUB UR-MCNC: ABNORMAL
BUN SERPL-MCNC: 16 MG/DL — SIGNIFICANT CHANGE UP (ref 10–20)
BUN SERPL-MCNC: 17 MG/DL — SIGNIFICANT CHANGE UP (ref 10–20)
CALCIUM SERPL-MCNC: 7.5 MG/DL — LOW (ref 8.4–10.5)
CALCIUM SERPL-MCNC: 8.1 MG/DL — LOW (ref 8.4–10.5)
CAST: 1 /LPF — SIGNIFICANT CHANGE UP (ref 0–4)
CHLORIDE SERPL-SCNC: 101 MMOL/L — SIGNIFICANT CHANGE UP (ref 98–110)
CHLORIDE SERPL-SCNC: 103 MMOL/L — SIGNIFICANT CHANGE UP (ref 98–110)
CO2 SERPL-SCNC: 22 MMOL/L — SIGNIFICANT CHANGE UP (ref 17–32)
CO2 SERPL-SCNC: 24 MMOL/L — SIGNIFICANT CHANGE UP (ref 17–32)
COLOR SPEC: SIGNIFICANT CHANGE UP
CREAT SERPL-MCNC: 0.6 MG/DL — LOW (ref 0.7–1.5)
CREAT SERPL-MCNC: 0.7 MG/DL — SIGNIFICANT CHANGE UP (ref 0.7–1.5)
DIFF PNL FLD: ABNORMAL
EGFR: 83 ML/MIN/1.73M2 — SIGNIFICANT CHANGE UP
EGFR: 86 ML/MIN/1.73M2 — SIGNIFICANT CHANGE UP
EOSINOPHIL # BLD AUTO: 0.01 K/UL — SIGNIFICANT CHANGE UP (ref 0–0.7)
EOSINOPHIL NFR BLD AUTO: 0.2 % — SIGNIFICANT CHANGE UP (ref 0–8)
GLUCOSE SERPL-MCNC: 103 MG/DL — HIGH (ref 70–99)
GLUCOSE SERPL-MCNC: 112 MG/DL — HIGH (ref 70–99)
GLUCOSE UR QL: NEGATIVE MG/DL — SIGNIFICANT CHANGE UP
HCT VFR BLD CALC: 29.7 % — LOW (ref 37–47)
HCT VFR BLD CALC: 32.7 % — LOW (ref 37–47)
HGB BLD-MCNC: 10.6 G/DL — LOW (ref 12–16)
HGB BLD-MCNC: 9.7 G/DL — LOW (ref 12–16)
IMM GRANULOCYTES NFR BLD AUTO: 0.4 % — HIGH (ref 0.1–0.3)
INR BLD: 1.02 RATIO — SIGNIFICANT CHANGE UP (ref 0.65–1.3)
KETONES UR-MCNC: >=160 MG/DL
LEUKOCYTE ESTERASE UR-ACNC: ABNORMAL
LIDOCAIN IGE QN: 29 U/L — SIGNIFICANT CHANGE UP (ref 7–60)
LYMPHOCYTES # BLD AUTO: 0.26 K/UL — LOW (ref 1.2–3.4)
LYMPHOCYTES # BLD AUTO: 5 % — LOW (ref 20.5–51.1)
MCHC RBC-ENTMCNC: 32.4 G/DL — SIGNIFICANT CHANGE UP (ref 32–37)
MCHC RBC-ENTMCNC: 32.7 G/DL — SIGNIFICANT CHANGE UP (ref 32–37)
MCHC RBC-ENTMCNC: 36.7 PG — HIGH (ref 27–31)
MCHC RBC-ENTMCNC: 37.5 PG — HIGH (ref 27–31)
MCV RBC AUTO: 113.1 FL — HIGH (ref 81–99)
MCV RBC AUTO: 114.7 FL — HIGH (ref 81–99)
MONOCYTES # BLD AUTO: 0.28 K/UL — SIGNIFICANT CHANGE UP (ref 0.1–0.6)
MONOCYTES NFR BLD AUTO: 5.3 % — SIGNIFICANT CHANGE UP (ref 1.7–9.3)
NEUTROPHILS # BLD AUTO: 4.67 K/UL — SIGNIFICANT CHANGE UP (ref 1.4–6.5)
NEUTROPHILS NFR BLD AUTO: 88.9 % — HIGH (ref 42.2–75.2)
NITRITE UR-MCNC: NEGATIVE — SIGNIFICANT CHANGE UP
NRBC BLD AUTO-RTO: 0 /100 WBCS — SIGNIFICANT CHANGE UP (ref 0–0)
PH UR: 6 — SIGNIFICANT CHANGE UP (ref 5–8)
PLATELET # BLD AUTO: 208 K/UL — SIGNIFICANT CHANGE UP (ref 130–400)
PLATELET # BLD AUTO: 217 K/UL — SIGNIFICANT CHANGE UP (ref 130–400)
PMV BLD: 10.3 FL — SIGNIFICANT CHANGE UP (ref 7.4–10.4)
PMV BLD: 9.7 FL — SIGNIFICANT CHANGE UP (ref 7.4–10.4)
POTASSIUM SERPL-MCNC: 3.8 MMOL/L — SIGNIFICANT CHANGE UP (ref 3.5–5)
POTASSIUM SERPL-MCNC: 4.2 MMOL/L — SIGNIFICANT CHANGE UP (ref 3.5–5)
POTASSIUM SERPL-SCNC: 3.8 MMOL/L — SIGNIFICANT CHANGE UP (ref 3.5–5)
POTASSIUM SERPL-SCNC: 4.2 MMOL/L — SIGNIFICANT CHANGE UP (ref 3.5–5)
PROT SERPL-MCNC: 5.3 G/DL — LOW (ref 6–8)
PROT SERPL-MCNC: 6.2 G/DL — SIGNIFICANT CHANGE UP (ref 6–8)
PROT UR-MCNC: 100 MG/DL
PROTHROM AB SERPL-ACNC: 12.1 SEC — SIGNIFICANT CHANGE UP (ref 9.95–12.87)
RBC # BLD: 2.59 M/UL — LOW (ref 4.2–5.4)
RBC # BLD: 2.89 M/UL — LOW (ref 4.2–5.4)
RBC # FLD: 13.8 % — SIGNIFICANT CHANGE UP (ref 11.5–14.5)
RBC # FLD: 14.3 % — SIGNIFICANT CHANGE UP (ref 11.5–14.5)
RBC CASTS # UR COMP ASSIST: 47 /HPF — HIGH (ref 0–4)
SODIUM SERPL-SCNC: 139 MMOL/L — SIGNIFICANT CHANGE UP (ref 135–146)
SODIUM SERPL-SCNC: 139 MMOL/L — SIGNIFICANT CHANGE UP (ref 135–146)
SP GR SPEC: 1.03 — SIGNIFICANT CHANGE UP (ref 1–1.03)
SQUAMOUS # UR AUTO: 2 /HPF — SIGNIFICANT CHANGE UP (ref 0–5)
TROPONIN T, HIGH SENSITIVITY RESULT: 11 NG/L — SIGNIFICANT CHANGE UP (ref 6–13)
TROPONIN T, HIGH SENSITIVITY RESULT: 7 NG/L — SIGNIFICANT CHANGE UP (ref 6–13)
URATE CRY FLD QL MICRO: PRESENT
UROBILINOGEN FLD QL: 2 MG/DL (ref 0.2–1)
WBC # BLD: 5.25 K/UL — SIGNIFICANT CHANGE UP (ref 4.8–10.8)
WBC # BLD: 5.43 K/UL — SIGNIFICANT CHANGE UP (ref 4.8–10.8)
WBC # FLD AUTO: 5.25 K/UL — SIGNIFICANT CHANGE UP (ref 4.8–10.8)
WBC # FLD AUTO: 5.43 K/UL — SIGNIFICANT CHANGE UP (ref 4.8–10.8)
WBC UR QL: 3 /HPF — SIGNIFICANT CHANGE UP (ref 0–5)

## 2025-02-27 PROCEDURE — 86901 BLOOD TYPING SEROLOGIC RH(D): CPT

## 2025-02-27 PROCEDURE — 84436 ASSAY OF TOTAL THYROXINE: CPT

## 2025-02-27 PROCEDURE — 80074 ACUTE HEPATITIS PANEL: CPT

## 2025-02-27 PROCEDURE — 86880 COOMBS TEST DIRECT: CPT

## 2025-02-27 PROCEDURE — 99284 EMERGENCY DEPT VISIT MOD MDM: CPT | Mod: 57

## 2025-02-27 PROCEDURE — 86850 RBC ANTIBODY SCREEN: CPT

## 2025-02-27 PROCEDURE — 76705 ECHO EXAM OF ABDOMEN: CPT | Mod: 26

## 2025-02-27 PROCEDURE — 71045 X-RAY EXAM CHEST 1 VIEW: CPT | Mod: 26

## 2025-02-27 PROCEDURE — 36415 COLL VENOUS BLD VENIPUNCTURE: CPT

## 2025-02-27 PROCEDURE — 99223 1ST HOSP IP/OBS HIGH 75: CPT

## 2025-02-27 PROCEDURE — 80048 BASIC METABOLIC PNL TOTAL CA: CPT

## 2025-02-27 PROCEDURE — 93010 ELECTROCARDIOGRAM REPORT: CPT

## 2025-02-27 PROCEDURE — 86900 BLOOD TYPING SEROLOGIC ABO: CPT

## 2025-02-27 PROCEDURE — 84443 ASSAY THYROID STIM HORMONE: CPT

## 2025-02-27 PROCEDURE — 80076 HEPATIC FUNCTION PANEL: CPT

## 2025-02-27 PROCEDURE — 85025 COMPLETE CBC W/AUTO DIFF WBC: CPT

## 2025-02-27 PROCEDURE — 85610 PROTHROMBIN TIME: CPT

## 2025-02-27 PROCEDURE — 85730 THROMBOPLASTIN TIME PARTIAL: CPT

## 2025-02-27 PROCEDURE — 74177 CT ABD & PELVIS W/CONTRAST: CPT | Mod: 26

## 2025-02-27 PROCEDURE — 83735 ASSAY OF MAGNESIUM: CPT

## 2025-02-27 PROCEDURE — 83036 HEMOGLOBIN GLYCOSYLATED A1C: CPT

## 2025-02-27 PROCEDURE — 86870 RBC ANTIBODY IDENTIFICATION: CPT

## 2025-02-27 PROCEDURE — 84100 ASSAY OF PHOSPHORUS: CPT

## 2025-02-27 PROCEDURE — 80053 COMPREHEN METABOLIC PANEL: CPT

## 2025-02-27 RX ORDER — METOPROLOL SUCCINATE 50 MG/1
50 TABLET, EXTENDED RELEASE ORAL DAILY
Refills: 0 | Status: DISCONTINUED | OUTPATIENT
Start: 2025-02-27 | End: 2025-02-28

## 2025-02-27 RX ORDER — SODIUM CHLORIDE 9 G/1000ML
1000 INJECTION, SOLUTION INTRAVENOUS
Refills: 0 | Status: DISCONTINUED | OUTPATIENT
Start: 2025-02-27 | End: 2025-02-28

## 2025-02-27 RX ORDER — CALCIUM CARBONATE 750 MG/1
1 TABLET ORAL DAILY
Refills: 0 | Status: DISCONTINUED | OUTPATIENT
Start: 2025-02-27 | End: 2025-02-28

## 2025-02-27 RX ORDER — HYDROXYUREA 500 MG/1
500 CAPSULE ORAL DAILY
Refills: 0 | Status: DISCONTINUED | OUTPATIENT
Start: 2025-02-27 | End: 2025-02-28

## 2025-02-27 RX ORDER — ACETAMINOPHEN 500 MG/5ML
650 LIQUID (ML) ORAL EVERY 6 HOURS
Refills: 0 | Status: DISCONTINUED | OUTPATIENT
Start: 2025-02-27 | End: 2025-02-28

## 2025-02-27 RX ORDER — PIPERACILLIN-TAZO-DEXTROSE,ISO 3.375G/5
3.38 IV SOLUTION, PIGGYBACK PREMIX FROZEN(ML) INTRAVENOUS EVERY 8 HOURS
Refills: 0 | Status: DISCONTINUED | OUTPATIENT
Start: 2025-02-27 | End: 2025-02-28

## 2025-02-27 RX ORDER — ENOXAPARIN SODIUM 100 MG/ML
30 INJECTION SUBCUTANEOUS EVERY 24 HOURS
Refills: 0 | Status: DISCONTINUED | OUTPATIENT
Start: 2025-02-27 | End: 2025-02-27

## 2025-02-27 RX ORDER — PIPERACILLIN-TAZO-DEXTROSE,ISO 3.375G/5
3.38 IV SOLUTION, PIGGYBACK PREMIX FROZEN(ML) INTRAVENOUS ONCE
Refills: 0 | Status: COMPLETED | OUTPATIENT
Start: 2025-02-27 | End: 2025-02-27

## 2025-02-27 RX ORDER — METOPROLOL SUCCINATE 50 MG/1
50 TABLET, EXTENDED RELEASE ORAL DAILY
Refills: 0 | Status: DISCONTINUED | OUTPATIENT
Start: 2025-02-27 | End: 2025-02-27

## 2025-02-27 RX ORDER — CEFTRIAXONE 500 MG/1
1000 INJECTION, POWDER, FOR SOLUTION INTRAMUSCULAR; INTRAVENOUS EVERY 24 HOURS
Refills: 0 | Status: DISCONTINUED | OUTPATIENT
Start: 2025-02-27 | End: 2025-02-27

## 2025-02-27 RX ORDER — ENOXAPARIN SODIUM 100 MG/ML
40 INJECTION SUBCUTANEOUS EVERY 24 HOURS
Refills: 0 | Status: DISCONTINUED | OUTPATIENT
Start: 2025-02-27 | End: 2025-02-27

## 2025-02-27 RX ORDER — KETOROLAC TROMETHAMINE 30 MG/ML
15 INJECTION, SOLUTION INTRAMUSCULAR; INTRAVENOUS EVERY 6 HOURS
Refills: 0 | Status: DISCONTINUED | OUTPATIENT
Start: 2025-02-27 | End: 2025-02-28

## 2025-02-27 RX ORDER — HEPARIN SODIUM 1000 [USP'U]/ML
INJECTION INTRAVENOUS; SUBCUTANEOUS
Qty: 25000 | Refills: 0 | Status: DISCONTINUED | OUTPATIENT
Start: 2025-02-27 | End: 2025-02-27

## 2025-02-27 RX ADMIN — METOPROLOL SUCCINATE 50 MILLIGRAM(S): 50 TABLET, EXTENDED RELEASE ORAL at 06:20

## 2025-02-27 RX ADMIN — Medication 200 GRAM(S): at 06:03

## 2025-02-27 RX ADMIN — Medication 650 MILLIGRAM(S): at 06:20

## 2025-02-27 RX ADMIN — Medication 20 MILLIGRAM(S): at 01:55

## 2025-02-27 RX ADMIN — HEPARIN SODIUM 800 UNIT(S)/HR: 1000 INJECTION INTRAVENOUS; SUBCUTANEOUS at 07:38

## 2025-02-27 RX ADMIN — HYDROXYUREA 500 MILLIGRAM(S): 500 CAPSULE ORAL at 06:20

## 2025-02-27 NOTE — ED PROVIDER NOTE - PHYSICAL EXAMINATION
Vital Signs: I have reviewed the initial vital signs.  Constitutional: appears stated age, no acute distress  Eyes: Sclera clear, EOMI.  Cardiovascular: S1 and S2, regular rate, regular rhythm, well-perfused extremities, radial pulses equal and 2+, pedal pulses 2+ and equal  Respiratory: unlabored respiratory effort, clear to auscultation bilaterally no wheezing, rales, or rhonchi  Gastrointestinal:  abdomen soft, epigastric and RUQ tenderness to palpation   Musculoskeletal: supple neck, no lower extremity edema  Integumentary: warm, dry, no rash  Neurologic: awake, alert, oriented x3, extremities’ motor and sensory functions grossly intact

## 2025-02-27 NOTE — CONSULT NOTE ADULT - ASSESSMENT
Patient is a 89 y/o female with a PMHx of anemia, thrombocytosis, recent admission for new onset  Afib RVR, lap appy with Dr. Anderson 04/2024 who presents to the ED complaining of abdominal pain for the past two days. The patient is Yakut speaking who had family at bedside . The patient reports that she had abdominal pain for the past 2 days. The patient reports that she had no nausea no vomiting no fever or chills. The patient reports pain is located in the RUQ. The patient reports that this happened before in past a week ago but pain subsided. No alleviating factors at the time. Since admission she feels more comfortable. She is admitted to surgical team. She has an intact Gallbladder with wall thickening and stones. CBD is 3-4mm with T floyd now of 2.0.     Chololithiasis /Biliary Colic/ Abnormal LFT  - Discussed with patient and family EUS +/- ERCP tomorrow  - Please hold heparin gtt starting at 8am tomorrow  - Would benefit from CCY after- Can have today if done with IOC  - Could eat if ok with primary team  - NPO after midnight, will follow

## 2025-02-27 NOTE — ED PROVIDER NOTE - CLINICAL SUMMARY MEDICAL DECISION MAKING FREE TEXT BOX
Patient is evaluated for abdominal pain, labs and imaging reviewed.  Given persistent and slightly worsening transaminitis and evidence of cholelithiasis with tenderness, concern for cholecystitis.  Surgery consulted.  Requested patient be admitted to their service for continued monitoring and further workup and evaluation, IV antibiotics started

## 2025-02-27 NOTE — ED PROVIDER NOTE - OBJECTIVE STATEMENT
89-year-old female with past medical history A-fib, HLD, anemia, normocytosis presents with complaint of abdominal pain.  Patient was discharged earlier today from the hospital, where she was admitted for A-fib with RVR.  Reports at 5 PM today she had episode of epigastric abdominal pain that was nonradiating, and similar to what brought her to the hospital prior to her previous admission.  Denies fever, chills, chest pain, shortness of breath, vomiting/diarrhea, change in bowel/bladder habits, lightheadedness, dizziness.

## 2025-02-27 NOTE — ED PROVIDER NOTE - ATTENDING APP SHARED VISIT CONTRIBUTION OF CARE
89-year-old female with PMH A-fib, HLD, history anemia, CHF, history of thrombocytosis on aspirin presents for evaluation of abdominal pain.  Patient recently admitted and had workup and patient with transaminitis noted thought to be possibly related to CHF.  Patient discharged and continues to have pain prompting return to ED.  Denies fevers or chills.  No vomiting or diarrhea, urinary complaints.    VITAL SIGNS: noted  CONSTITUTIONAL: Well-developed; well-nourished; in no acute distress  HEAD: Normocephalic; atraumatic  EYES: PERRL, EOM intact; conjunctiva and sclera clear  ENT: No nasal discharge; airway clear. MMM  NECK: Supple; non tender.    CARD: S1, S2 normal; no murmurs, gallops, or rubs. Regular rate and rhythm  RESP: CTAB/L, no wheezes, rales or rhonchi  ABD: Normal bowel sounds; soft; non-distended; + epigastric and RUQ ttp    EXT: Normal ROM. No calf tenderness or edema. Distal pulses intact  NEURO: Alert, oriented. Grossly unremarkable. No focal deficits  SKIN: Skin exam is warm and dry

## 2025-02-27 NOTE — H&P ADULT - ASSESSMENT
Patient is a 88 yo female with PMH anemia, thrombocytosis, recent admission for new onset  Afib RVR, lap appy with Dr. Anderson 04/2024 who presents to the ED complaining of abdominal pain for the past two days. Clinical presentation and radiographic findings consistent with acute cholecystiits.     Plan  -Admit to Dr Alamo, pending EKG if sinus will go to floor, if Afib will require SDU consult  -NPO, IVF  -Zosyn  -Pain control  -Hospitalist Comanagement  -Advanced GI consult for possible EUS/ERCP  -Echo  -Patient requires completed med rec, [facility]    [phone]  Case discussed with Dr. Alamo

## 2025-02-27 NOTE — CONSULT NOTE ADULT - ASSESSMENT
Patient is a 88 y/o female with a PMHx of anemia, thrombocytosis, recent admission for new onset  Afib RVRcalvin appy with Dr. Anderson 04/2024 who presents to the ED complaining of abdominal pain for the two days prior to admission. She is admitted to surgical team. She has an intact Gallbladder with wall thickening and stones. CBD is 3-4mm with T floyd now of 2.0. Medicine consulted for comanagement.      Problem list:  #Paroxsymal Afib  #Biliary colic with Cholelithiasis   #Transaminitis  #HFpEF  # Severe MR/TR   #Severe Pulm HTN   #Hypocalcemia (corrected Ca = 8)  #Macrocytic anemia     Plan:  Management per Surgery  All imaging, labs, and vitals personally reviewed   - PT/OOBTC/AAT  - Multimodal pain regimen   - Bowel regimen PRN / stool count   - Monitor H&H and for signs of bleeding; transfuse to maintain Hgb >8   - Monitor for fever and WBC trend   - Planned for EUS +/- ERCP tomorrow; NPO after midnight   - Cardio rec therapeutic AC; consider Heparin gtt vs Lovenox until certain that pt is no have operative intervention   - c/w Toprol XL 50mg qd  - f/u TSH/fT4, A1c  - document BMI   - check B12, folate lvls   - Give Calcium gluconate 1 gram IV x1  - AM CMP/Mg; monitor lytes/LFTs  - Incentive spirometer 10x/hr while awake  - Outpt Structural Cardiology   - noted IVF; strict I/O's / daily weight / 1.5L fluid restriction / avoid fluid overload  - Supplemental oxygen PRN to maintain PO2 >92%; currently on RA sat well at 96%      If any questions, please send a message or call on [employer] Teams.    Management per Surgery, Medicine for co-management.    Total time spent to complete patient's bedside assessment, reviewed medical chart, discussed medical plan of care with team was more than 60 minutes with >50% of time spent face to face with patient, discussion with patient/family and/or coordination of care.

## 2025-02-27 NOTE — ED PROVIDER NOTE - IV ALTEPLASE EXCL ABS HIDDEN
Finasteride Counseling:  I discussed with the patient the risks of use of finasteride including but not limited to decreased libido, decreased ejaculate volume, gynecomastia, and depression. Women should not handle medication.  All of the patient's questions and concerns were addressed. Finasteride Male Counseling: Finasteride Counseling:  I discussed with the patient the risks of use of finasteride including but not limited to decreased libido, decreased ejaculate volume, gynecomastia, and depression. Women should not handle medication.  All of the patient's questions and concerns were addressed. show

## 2025-02-27 NOTE — H&P ADULT - HISTORY OF PRESENT ILLNESS
Patient is a 88 yo female with PMH anemia, thrombocytosis, recent admission for new onset  Afib RVR, lap appy with Dr. Anderson 04/2024 who presents to the ED complaining of abdominal pain for the past two days. The patient is Sinhala speaking who had daughter on phone. The patient reports that she had abdominal pain for the past 2 days. The patient reports that she had no nausea no vomiting no fever or chills. The patient reports pain is located in the RUQ. The patient reports that this happened before in past a week ago but pain subsided.

## 2025-02-27 NOTE — CONSULT NOTE ADULT - SUBJECTIVE AND OBJECTIVE BOX
Patient is a 89 y/o female with a PMHx of anemia, thrombocytosis, recent admission for new onset  Afib RVR, lap appy with Dr. Anderson 2024 who presents to the ED complaining of abdominal pain for the past two days. The patient is Tamazight speaking who had family at bedside . The patient reports that she had abdominal pain for the past 2 days. The patient reports that she had no nausea no vomiting no fever or chills. The patient reports pain is located in the RUQ. The patient reports that this happened before in past a week ago but pain subsided. No alleviating factors at the time. Since admission she feels more comfortable.    PAST MEDICAL & SURGICAL HISTORY:  HLD (hyperlipidemia)  Anemia      MEDICATIONS  (STANDING):  acetaminophen     Tablet .. 650 milliGRAM(s) Oral every 6 hours  heparin  Infusion.  Unit(s)/Hr (8 mL/Hr) IV Continuous <Continuous>  hydroxyurea 500 milliGRAM(s) Oral daily  lactated ringers. 1000 milliLiter(s) (80 mL/Hr) IV Continuous <Continuous>  metoprolol succinate ER 50 milliGRAM(s) Oral daily    MEDICATIONS  (PRN):  calcium carbonate   1250 mG (OsCal) 1 Tablet(s) Oral daily PRN Heartburn  ketorolac   Injectable 15 milliGRAM(s) IV Push every 6 hours PRN Severe Pain (7 - 10)      Allergies  No Known Allergies      Review of Systems  General:  Denies Fatigue, Denies Fever, Denies Weakness ,Denies Weight Loss   HEENT: Denies Trouble Swallowing ,Denies  Sore Throat , Denies Change in hearing/vision/speech ,Denies Dizziness    Cardio: Denies  Chest Pain , Palpitations    Respiratory: Denies worsening of SOB, Denies Cough  Abdomen: See detailed HPI  Neuro: Denies Headache Denies Dizziness, Denies Paresthesias  MSK: Denies pain in Bones/Joints/Muscles   Psych: Patient denies depression, denies suicidal or homicidal ideations  Integ: Patient Denies rash, or new skin lesions     Vital Signs Last 24 Hrs  T(C): 36.6 (2025 02:13), Max: 36.6 (2025 12:04)  T(F): 97.9 (2025 02:13), Max: 97.9 (2025 12:04)  HR: 89 (2025 02:13) (75 - 89)  BP: 137/72 (2025 02:13) (130/82 - 151/75)  BP(mean): 100 (2025 12:04) (100 - 100)  RR: 19 (2025 02:13) (18 - 19)  SpO2: 99% (2025 02:13) (97% - 99%)    Parameters below as of 2025 02:13  Patient On (Oxygen Delivery Method): room air    Physical Exam  Gen: NAD  Head: NC/AT, no visible deformity  ENT: PERRLA, Sclera Non Icteric   Cardio: S1/S2 No S3/S4, Regular  Resp: CTA B/L  Abdomen: Soft, ND/ TTP RUQ  Neuro: AAOx3  Extremities: FROM x 4  Skin: No jaundice, no excoriation       Labs:                    10.6   5.25  )-----------( 208      ( 2025 00:40 )             32.7       Auto Immature Granulocyte %: 0.4 % (25 @ 00:40)        139  |  101  |  17  ----------------------------<  112[H]  4.2   |  22  |  0.6[L]      Calcium: 8.1 mg/dL (25 @ 00:40)      LFTs:             6.2  | 2.0  | 233      ------------------[479     ( 2025 00:40 )  3.7  | x    | 273         Lipase:29       Lactate, Blood: 2.0 mmol/L (25 @ 20:31)  Blood Gas Venous - Lactate: 3.3 mmol/L (25 @ 18:42)    Coags:     x      ----< x       ( 2025 06:08 )     32.3        Urinalysis Basic - ( 2025 00:40 )  Color: Dark Yellow / Appearance: Cloudy / S.028 / pH: x  Gluc: 112 mg/dL / Ketone: >=160 mg/dL  / Bili: Moderate / Urobili: 2.0 mg/dL   Blood: x / Protein: 100 mg/dL / Nitrite: Negative   Leuk Esterase: Small / RBC: 47 /HPF / WBC 3 /HPF   Sq Epi: x / Non Sq Epi: 2 /HPF / Bacteria: Few /HPF    RADIOLOGY & ADDITIONAL STUDIES:  US Abdomen Upper Quadrant Right 25   IMPRESSION:  Cholelithiasis and sludge without definite sonographic evidence of acute   cholecystitis. Nonspecific borderline wall thickening.    CT Abdomen and Pelvis w/ IV Cont 25   IMPRESSION:    Cholelithiasis and non-specific gallbladder wall edema/thickening.   Correlate with symptoms. Consider HIDA scan for further evaluation as   needed.

## 2025-02-27 NOTE — CONSULT NOTE ADULT - ATTENDING COMMENTS
Patient seen and examined at bedside  pafib currently in sinus rhythm rate control  will benefit of full AC as per above  valvular disease, currently no evidence of decompensated heart failure can follow up as outpatient   management of cholecystitis as per primary team

## 2025-02-27 NOTE — CONSULT NOTE ADULT - SUBJECTIVE AND OBJECTIVE BOX
Outpt cardiologist:    HPI:  Patient is a 89 yo female with PMH anemia, thrombocytosis, recent admission for new onset  Afib RVR, lap appy with Dr. Anderson 2024 who presents to the ED complaining of abdominal pain for the past two days. The patient is Setsw[place] speaking who had daughter on phone. The patient reports that she had abdominal pain for the past 2 days. The patient reports that she had no nausea no vomiting no fever or chills. The patient reports pain is located in the RUQ. The patient reports that this happened before in past a week ago but pain subsided.  (2025 05:35)      ---  Cardiac Fellow Additional notes:        PAST MEDICAL & SURGICAL HISTORY  HLD (hyperlipidemia)    Anemia        FAMILY HISTORY:  FAMILY HISTORY:      SOCIAL HISTORY:  Social History:      ALLERGIES:  No Known Allergies      MEDICATIONS:  acetaminophen     Tablet .. 650 milliGRAM(s) Oral every 6 hours  heparin  Infusion.  Unit(s)/Hr (8 mL/Hr) IV Continuous <Continuous>  hydroxyurea 500 milliGRAM(s) Oral daily  lactated ringers. 1000 milliLiter(s) (80 mL/Hr) IV Continuous <Continuous>  metoprolol succinate ER 50 milliGRAM(s) Oral daily    PRN:  calcium carbonate   1250 mG (OsCal) 1 Tablet(s) Oral daily PRN  ketorolac   Injectable 15 milliGRAM(s) IV Push every 6 hours PRN      HOME MEDICATIONS:  Home Medications:  hydroxyurea 500 mg oral capsule: 15 mg/kg orally once a day (2025 01:18)      VITALS:   T(F): 97.9 ( @ 02:13), Max: 99.7 ( @ 18:05)  HR: 89 ( @ 02:13) (75 - 146)  BP: 137/72 ( @ 02:13) (86/62 - 151/75)  BP(mean): 100 ( @ 12:04) (71 - 100)  RR: 19 ( @ 02:13) (16 - 19)  SpO2: 99% ( @ 02:13) (97% - 99%)    I&O's Summary      REVIEW OF SYSTEMS:  CONSTITUTIONAL: No weakness, fevers or chills  HEENT: No visual changes, neck/ear pain  RESPIRATORY: No cough, sob  CARDIOVASCULAR: See HPI  GASTROINTESTINAL: No abdominal pain. No nausea, vomiting, diarrhea   GENITOURINARY: No dysuria, frequency or hematuria  NEUROLOGICAL: No new focal deficits  SKIN: No new rashes    PHYSICAL EXAM:  General: Not in distress.  Non-toxic appearing.   HEENT: EOMI  Cardio: regular, S1, S2, no murmur  Pulm: B/L BS.  No wheezing / crackles / rales  Abdomen: Soft, non-tender, non-distended. Normoactive bowel sounds  Extremities: No edema b/l le  Neuro: A&O x3. No focal deficits    LABS:                        10.6   5.25  )-----------( 208      ( 2025 00:40 )             32.7         139  |  101  |  17  ----------------------------<  112[H]  4.2   |  22  |  0.6[L]    Ca    8.1[L]      2025 00:40  Mg     2.1         TPro  6.2  /  Alb  3.7  /  TBili  2.0[H]  /  DBili  x   /  AST  233[H]  /  ALT  273[H]  /  AlkPhos  479[H]      PTT - ( 2025 06:08 )  PTT:32.3 sec          Troponin trend:       Chol 156 LDL -- HDL 46[L] Trig 136      RADIOLOGY:    -CXR:    -TTE: 25   Summary:   1. Normal global left ventricular systolic function with a biplane EF of   63%. Moderate (grade 2) diastolic dysfunction. No regional wall motion   abnormalities.   2. Normal right ventricular size and function.   3. Mildly enlarged left atrium.   4. Normal right atrial size.   5. Posterior mitral leaflet prolapse with severe anteriorly directed   mitral regurgitation.   6. Severe tricuspid regurgitation.   7. Sclerotic aortic valve with normal opening and mild to moderate   aortic regurgitation.   8. Severe pulmonary hypertension. The IVC is normal in sizewith >50%   respiratory variability.   9. There is no evidence of pericardial effusion.    -CCTA:    -STRESS TEST:    -CATHETERIZATION:      -OTHER:  EC Lead ECG:   Ventricular Rate 86 BPM    Atrial Rate 86 BPM    P-R Interval 172 ms    QRS Duration 90 ms    Q-T Interval 400 ms    QTC Calculation(Bazett) 478 ms    P Axis 69 degrees    R Axis 62 degrees    T Axis 44 degrees    Diagnosis Line Normal sinus rhythm  Cannot rule out Anterior infarct , age undetermined  Abnormal ECG    Confirmed by aZid Humphreys ([id number]) on 2025 9:15:20 AM ( @ 05:59)      TELEMETRY EVENTS:   Outpt cardiologist:    HPI:  Patient is a 88 yo female with PMH anemia, thrombocytosis, recent admission for new onset  Afib calvin LANG appy with Dr. Anderson 2024 who presents to the ED complaining of abdominal pain for the past two days. The patient is Azeri speaking who had daughter on phone. The patient reports that she had abdominal pain for the past 2 days. The patient reports that she had no nausea no vomiting no fever or chills. The patient reports pain is located in the RUQ. The patient reports that this happened before in past a week ago but pain subsided.  (2025 05:35)      ---  Cardiac Fellow Additional notes:    a 88 yo female with PMH anemia, thrombocytosis, admitted for acute cholecystitis.   Cardiology consulted for new AFIB on admission.  At baseline she has METS > 4 with no activity limitations like SOB nor chest pain/discomfort.     PAST MEDICAL & SURGICAL HISTORY  HLD (hyperlipidemia)    Anemia        FAMILY HISTORY:  FAMILY HISTORY:      SOCIAL HISTORY:  Social History:      ALLERGIES:  No Known Allergies      MEDICATIONS:  acetaminophen     Tablet .. 650 milliGRAM(s) Oral every 6 hours  heparin  Infusion.  Unit(s)/Hr (8 mL/Hr) IV Continuous <Continuous>  hydroxyurea 500 milliGRAM(s) Oral daily  lactated ringers. 1000 milliLiter(s) (80 mL/Hr) IV Continuous <Continuous>  metoprolol succinate ER 50 milliGRAM(s) Oral daily    PRN:  calcium carbonate   1250 mG (OsCal) 1 Tablet(s) Oral daily PRN  ketorolac   Injectable 15 milliGRAM(s) IV Push every 6 hours PRN      HOME MEDICATIONS:  Home Medications:  hydroxyurea 500 mg oral capsule: 15 mg/kg orally once a day (2025 01:18)      VITALS:   T(F): 97.9 ( @ 02:13), Max: 99.7 ( @ 18:05)  HR: 89 ( @ 02:13) (75 - 146)  BP: 137/72 ( @ 02:13) (86/62 - 151/75)  BP(mean): 100 ( @ 12:04) (71 - 100)  RR: 19 ( @ 02:13) (16 - 19)  SpO2: 99% ( @ 02:13) (97% - 99%)    I&O's Summary      REVIEW OF SYSTEMS:  CONSTITUTIONAL: No weakness, fevers or chills  HEENT: No visual changes, neck/ear pain  RESPIRATORY: No cough, sob  CARDIOVASCULAR: See HPI  GASTROINTESTINAL: No abdominal pain. No nausea, vomiting, diarrhea   GENITOURINARY: No dysuria, frequency or hematuria  NEUROLOGICAL: No new focal deficits  SKIN: No new rashes    PHYSICAL EXAM:  General: Not in distress.  Non-toxic appearing.   HEENT: EOMI  Cardio: regular, S1, S2, no murmur  Pulm: B/L BS.  No wheezing / crackles / rales  Abdomen: Soft, non-tender, non-distended. Normoactive bowel sounds  Extremities: No edema b/l le  Neuro: A&O x3. No focal deficits    LABS:                        10.6   5.25  )-----------( 208      ( 2025 00:40 )             32.7         139  |  101  |  17  ----------------------------<  112[H]  4.2   |  22  |  0.6[L]    Ca    8.1[L]      2025 00:40  Mg     2.1         TPro  6.2  /  Alb  3.7  /  TBili  2.0[H]  /  DBili  x   /  AST  233[H]  /  ALT  273[H]  /  AlkPhos  479[H]      PTT - ( 2025 06:08 )  PTT:32.3 sec      Troponin trend: -7        Chol 156 LDL -- HDL 46[L] Trig 136      RADIOLOGY:    -CXR:  Clear     -TTE: 25   Summary:   1. Normal global left ventricular systolic function with a biplane EF of   63%. Moderate (grade 2) diastolic dysfunction. No regional wall motion   abnormalities.   2. Normal right ventricular size and function.   3. Mildly enlarged left atrium.   4. Normal right atrial size.   5. Posterior mitral leaflet prolapse with severe anteriorly directed   mitral regurgitation.   6. Severe tricuspid regurgitation.   7. Sclerotic aortic valve with normal opening and mild to moderate   aortic regurgitation.   8. Severe pulmonary hypertension. The IVC is normal in sizewith >50%   respiratory variability.   9. There is no evidence of pericardial effusion.      EC Lead ECG:   Ventricular Rate 86 BPM    Atrial Rate 86 BPM    P-R Interval 172 ms    QRS Duration 90 ms    Q-T Interval 400 ms    QTC Calculation(Bazett) 478 ms    P Axis 69 degrees    R Axis 62 degrees    T Axis 44 degrees    Diagnosis Line Normal sinus rhythm  Cannot rule out Anterior infarct , age undetermined  Abnormal ECG    Confirmed by Zaid Humphreys ([id number]) on 2025 9:15:20 AM ( @ 05:59)      TELEMETRY EVENTS:   Outpt cardiologist:    HPI:  Patient is a 88 yo female with PMH anemia, thrombocytosis, recent admission for new onset  Afib calvin LANG appy with Dr. Anderson 2024 who presents to the ED complaining of abdominal pain for the past two days. The patient is Macedonian speaking who had daughter on phone. The patient reports that she had abdominal pain for the past 2 days. The patient reports that she had no nausea no vomiting no fever or chills. The patient reports pain is located in the RUQ. The patient reports that this happened before in past a week ago but pain subsided.  (2025 05:35)      ---  Cardiac Fellow Additional notes:    a 88 yo female with PMH anemia, thrombocytosis, admitted for acute cholecystitis.   Cardiology consulted for new AFIB on admission.  At baseline she has METS > 4 with no activity limitations like SOB nor chest pain/discomfort.     PAST MEDICAL & SURGICAL HISTORY  HLD (hyperlipidemia)    Anemia        FAMILY HISTORY:  FAMILY HISTORY:      SOCIAL HISTORY:  Social History:      ALLERGIES:  No Known Allergies      MEDICATIONS:  acetaminophen     Tablet .. 650 milliGRAM(s) Oral every 6 hours  heparin  Infusion.  Unit(s)/Hr (8 mL/Hr) IV Continuous <Continuous>  hydroxyurea 500 milliGRAM(s) Oral daily  lactated ringers. 1000 milliLiter(s) (80 mL/Hr) IV Continuous <Continuous>  metoprolol succinate ER 50 milliGRAM(s) Oral daily    PRN:  calcium carbonate   1250 mG (OsCal) 1 Tablet(s) Oral daily PRN  ketorolac   Injectable 15 milliGRAM(s) IV Push every 6 hours PRN      HOME MEDICATIONS:  Home Medications:  hydroxyurea 500 mg oral capsule: 15 mg/kg orally once a day (2025 01:18)      VITALS:   T(F): 97.9 ( @ 02:13), Max: 99.7 ( @ 18:05)  HR: 89 ( @ 02:13) (75 - 146)  BP: 137/72 ( @ 02:13) (86/62 - 151/75)  BP(mean): 100 ( @ 12:04) (71 - 100)  RR: 19 ( @ 02:13) (16 - 19)  SpO2: 99% ( @ 02:13) (97% - 99%)    I&O's Summary      REVIEW OF SYSTEMS:  CONSTITUTIONAL: No weakness, fevers or chills  HEENT: No visual changes, neck/ear pain  RESPIRATORY: No cough, sob  CARDIOVASCULAR: See HPI  GASTROINTESTINAL: No abdominal pain. No nausea, vomiting, diarrhea   GENITOURINARY: No dysuria, frequency or hematuria  NEUROLOGICAL: No new focal deficits  SKIN: No new rashes    PHYSICAL EXAM:  General: Not in distress.  Non-toxic appearing.   HEENT: EOMI  Cardio: regular, S1, S2, no murmur  Pulm: B/L BS.  No wheezing / crackles / rales  Abdomen: Soft, non-tender, non-distended. Normoactive bowel sounds  Extremities: No edema b/l le  Neuro: A&O x3. No focal deficits    LABS:                        10.6   5.25  )-----------( 208      ( 2025 00:40 )             32.7         139  |  101  |  17  ----------------------------<  112[H]  4.2   |  22  |  0.6[L]    Ca    8.1[L]      2025 00:40  Mg     2.1         TPro  6.2  /  Alb  3.7  /  TBili  2.0[H]  /  DBili  x   /  AST  233[H]  /  ALT  273[H]  /  AlkPhos  479[H]      PTT - ( 2025 06:08 )  PTT:32.3 sec      Troponin trend: -7        Chol 156 LDL -- HDL 46[L] Trig 136      RADIOLOGY:    -CXR:  Clear     -TTE: 25   Summary:   1. Normal global left ventricular systolic function with a biplane EF of   63%. Moderate (grade 2) diastolic dysfunction. No regional wall motion   abnormalities.   2. Normal right ventricular size and function.   3. Mildly enlarged left atrium.   4. Normal right atrial size.   5. Posterior mitral leaflet prolapse with severe anteriorly directed   mitral regurgitation.   6. Severe tricuspid regurgitation.   7. Sclerotic aortic valve with normal opening and mild to moderate   aortic regurgitation.   8. Severe pulmonary hypertension. The IVC is normal in sizewith >50%   respiratory variability.   9. There is no evidence of pericardial effusion.      EC Lead ECG:   Ventricular Rate 86 BPM    Atrial Rate 86 BPM    P-R Interval 172 ms    QRS Duration 90 ms    Q-T Interval 400 ms    QTC Calculation(Bazett) 478 ms    P Axis 69 degrees    R Axis 62 degrees    T Axis 44 degrees    Diagnosis Line Normal sinus rhythm  Cannot rule out Anterior infarct , age undetermined  Abnormal ECG    Confirmed by Zaid Humphreys ([id number]) on 2025 9:15:20 AM ( @ 05:59)      TELEMETRY EVENTS: NSR

## 2025-02-27 NOTE — CONSULT NOTE ADULT - ASSESSMENT
a 88 yo female with PMH anemia, thrombocytosis, admitted for acute cholecystitis.   Cardiology consulted for new AFIB on admission.  At baseline she has METS > 4 with no activity limitations like SOB nor chest pain/discomfort.     On exam she is warm and well perfused. No concern for fluid overload   Given severe pulmonary HTn on echo likely as a result of severe MR coupled with severe, patient will need JAIME for better assessment     ** NOTE INCOMPLETE**   88 yo female with PMH anemia, thrombocytosis, admitted for acute cholecystitis.   Cardiology consulted for new AFIB on admission.  At baseline she has METS > 4 with no activity limitations like SOB nor chest pain/discomfort.     On exam she is warm and well perfused. No concern for fluid overload.   She is now in normal sinus Rythm. Afib was likely primary driven by ongoing infection coupled with advanced age.   Given severe pulmonary HTn on echo likely as a result of severe MR coupled with severe TR (patient adequately compensated) , patient will need JAIME as outpatient for better assessment as priority at this time is her acute cholecystis     # Paroxsymal AFIB CHADVASC 3  # Acute cholecystitis   # Anemia   # Severe MR   # Severe TR   # Pulm HTN     PLAN   - Start apixaban 2.5 mg BID for AC when cleared by procedural team if any  - Cw metoprolol ER 50 mg daily   - Obtain free T4, TSH and A1C   - Fu with outpatient cardiology on discharge for afib and valvular lesion       ** NOTE INCOMPLETE**   88 yo female with PMH anemia, thrombocytosis, admitted for acute cholecystitis.   Cardiology consulted for new AFIB on admission.  At baseline she has METS > 4 with no activity limitations like SOB nor chest pain/discomfort.     On exam she is warm and well perfused. No concern for fluid overload.   She is now in normal sinus Rythm. Afib was likely primary driven by ongoing infection coupled with advanced age.   Given severe pulmonary HTn on echo likely as a result of severe MR coupled with severe TR (patient adequately compensated) , patient will need JAIME as outpatient for better assessment as priority at this time is her acute cholecystis     # Paroxsymal AFIB CHADVASC 3  # Acute cholecystitis   # Anemia   # Severe MR   # Severe TR   # Pulm HTN     PLAN   - Start apixaban 2.5 mg BID for AC when cleared by procedural team if any  - Cw metoprolol ER 50 mg daily   - Obtain free T4, TSH and A1C   - Fu with outpatient cardiology on discharge for afib and valvular lesion       89 yo female with PMH anemia, thrombocytosis, admitted for acute cholecystitis.   Cardiology consulted for new AFIB on admission.  At baseline she has METS > 4 with no activity limitations like SOB nor chest pain/discomfort.     On exam she is warm and well perfused. No concern for fluid overload.   She is now in normal sinus Rythm.   pulm HTN , valvular disease, currently compensating  can be followed as outpatient for further managment      # Paroxsymal AFIB CHADVASC 3  # Acute cholecystitis   # Severe MR   # Severe TR   # Pulm HTN     PLAN   - will benefit of full AC as primary stroke prevention, can switch to DOAC with  apixaban 2.5 mg BID   - Cw metoprolol ER 50 mg daily   - Obtain free T4, TSH and A1C   -cholecystitis management as per primary team

## 2025-02-27 NOTE — CONSULT NOTE ADULT - SUBJECTIVE AND OBJECTIVE BOX
CC: Patient is a 89y old  Female who presents with a chief complaint of Abdominal Pain (27 Feb 2025 11:38)      HPI:  Patient is a 89 yo female with PMH anemia, thrombocytosis, recent admission for new onset  Afib RVR, calvin appy with Dr. Anderson 04/2024 who presents to the ED complaining of abdominal pain for the past two days. The patient is Bulgarian speaking who had daughter on phone. The patient reports that she had abdominal pain for the past 2 days. The patient reports that she had no nausea no vomiting no fever or chills. The patient reports pain is located in the RUQ. The patient reports that this happened before in past a week ago but pain subsided.  (27 Feb 2025 05:35)      Patient seen and examined at bedside. No acute overnight events. No acute complaints this morning. ROS otherwise negative on a 10-point assessment.     PAST MEDICAL & SURGICAL HISTORY:  HLD (hyperlipidemia)      Anemia        SOCIAL HISTORY:  Tobacco Usage:  (   ) never smoked   (   ) former smoker   (   ) current smoker  (     ) pack years    Tobacco Quit Date:  Substance Use (Street drugs): (  ) never used  (  ) other:  Alcohol Usage:    Family history reviewed and otherwise non-contributory  ALLERGIES: No Known Allergies    MEDICATIONS:  MEDICATIONS  (STANDING):  acetaminophen     Tablet .. 650 milliGRAM(s) Oral every 6 hours  hydroxyurea 500 milliGRAM(s) Oral daily  lactated ringers. 1000 milliLiter(s) (80 mL/Hr) IV Continuous <Continuous>  metoprolol succinate ER 50 milliGRAM(s) Oral daily  piperacillin/tazobactam IVPB.. 3.375 Gram(s) IV Intermittent every 8 hours    MEDICATIONS  (PRN):  calcium carbonate   1250 mG (OsCal) 1 Tablet(s) Oral daily PRN Heartburn  ketorolac   Injectable 15 milliGRAM(s) IV Push every 6 hours PRN Severe Pain (7 - 10)      Home Medications:  hydroxyurea 500 mg oral capsule: 15 mg/kg orally once a day (25 Feb 2025 01:18)      Vital Signs Last 24 Hrs  T(F): 97.9 (27 Feb 2025 15:50), Max: 97.9 (26 Feb 2025 20:28)  HR: 80 (27 Feb 2025 15:50) (75 - 89)  BP: 114/56 (27 Feb 2025 15:50) (114/56 - 137/72)  RR: 18 (27 Feb 2025 15:50) (18 - 19)  SpO2: 96% (27 Feb 2025 15:50) (96% - 99%)    I&O's Summary      PHYSICAL EXAM:  GENERAL: NAD, well-groomed, well-developed  HEAD:  Atraumatic, Normocephalic  EYES: EOMI, PERRLA, conjunctiva and sclera clear  ENMT: No tonsillar erythema, exudates, or enlargement; Moist mucous membranes  NECK: Supple, No JVD  CHEST/LUNG: Clear to auscultation bilaterally; No rales, rhonchi, wheezing, or rubs  HEART: Regular rate and rhythm; S1/S2, No murmurs, rubs, or gallops  ABDOMEN: Soft, Nontender, Nondistended; Bowel sounds present x4 quadrants  VASCULAR: Normal pulses, Normal capillary refill  EXTREMITIES:  2+ Peripheral Pulses, No cyanosis, No edema  SKIN: Warm, Intact  PSYCH: Normal mood and affect  NERVOUS SYSTEM:  AAOx3, No focal deficits, SILT    LABS:                        10.6   5.25  )-----------( 208      ( 27 Feb 2025 00:40 )             32.7     02-27    139  |  103  |  16  ----------------------------<  103  3.8   |  24  |  0.7    Ca    7.5      27 Feb 2025 11:44  Mg     2.1     02-26    TPro  5.3  /  Alb  3.4  /  TBili  2.4  /  DBili  x   /  AST  210  /  ALT  261  /  AlkPhos  520  02-27      PTT - ( 27 Feb 2025 11:44 )  PTT:52.6 sec  Urinalysis Basic - ( 27 Feb 2025 11:44 )    Color: x / Appearance: x / SG: x / pH: x  Gluc: 103 mg/dL / Ketone: x  / Bili: x / Urobili: x   Blood: x / Protein: x / Nitrite: x   Leuk Esterase: x / RBC: x / WBC x   Sq Epi: x / Non Sq Epi: x / Bacteria: x    Lactate, Blood: 2.0 mmol/L (02-24 @ 20:31)      TSH 1.75   TSH with FT4 reflex --  Total T3 --        RADIOLOGY & ADDITIONAL TESTS:    < from: 12 Lead ECG (02.27.25 @ 05:59) >  Ventricular Rate 86 BPM    Atrial Rate 86 BPM    P-R Interval 172 ms    QRS Duration 90 ms    Q-T Interval 400 ms    QTC Calculation(Bazett) 478 ms    P Axis 69 degrees    R Axis 62 degrees    T Axis 44 degrees    Diagnosis Line Normal sinus rhythm  Cannot rule out Anterior infarct , age undetermined  Abnormal ECG    < end of copied text >    < from: TTE Echo Complete w/o Contrast w/ Doppler (02.26.25 @ 08:07) >  Summary:   1. Normal global left ventricular systolic function with a biplane EF of   63%. Moderate (grade 2) diastolic dysfunction. No regional wall motion   abnormalities.   2. Normal right ventricular size and function.   3. Mildly enlarged left atrium.   4. Normal right atrial size.   5. Posterior mitral leaflet prolapse with severe anteriorly directed   mitral regurgitation.   6. Severe tricuspid regurgitation.   7. Sclerotic aortic valve with normal opening and mild to moderate   aortic regurgitation.   8. Severe pulmonary hypertension. The IVC is normal in size with >50%   respiratory variability.   9. There is no evidence of pericardial effusion.    PHYSICIAN INTERPRETATION:  Left Ventricle: The left ventricular internal cavity size is normal.   There is no left ventricular hypertrophy. Global LV systolic function was   normal. Spectral Doppler shows pseudonormal pattern of left ventricular   myocardial filling (Grade II diastolic dysfunction).  Right Ventricle: Normal right ventricular size and function.  Left Atrium: Mildly enlarged left atrium. LA volume Index is 38.0 ml/m²   ml/m2.  Right Atrium: Normal right atrial size.  Pericardium: There is no evidence of pericardial effusion.  Mitral Valve: No evidence of mitral valve stenosis. Severe mitral valve   regurgitation is seen. Posterior mitral leaflet prolapse.  Tricuspid Valve: Structurally normal tricuspid valve, with normal leaflet   excursion. Severe tricuspid regurgitation is visualized. Estimated   pulmonary artery systolic pressure is 73.2 mmHg assuming a right atrial   pressure of 3mmHg, which is consistent with severe pulmonary   hypertension.  Aortic Valve: The aortic valve is trileaflet. Sclerotic aortic valve with   normal opening. Mild to moderate aortic valve regurgitation is seen.  Pulmonic Valve: Structurally normal pulmonic valve, with normal leaflet   excursion. Trace pulmonic valve regurgitation.  Aorta: The aortic root and ascending aorta are structurally normal, with   no evidence of dilitation.  Venous: The inferior vena cava was normal sized, with respiratorysize   variation greater than 50%.    < end of copied text >    < from: Xray Chest 1 View- PORTABLE-Urgent (02.24.25 @ 18:59) >  Findings/  impression:    Support devices: None.    Cardiac/mediastinum/hilum: No significant change    Skeleton/soft tissues: No significant change.    Lung parenchyma/Pleura: There is no evidence of focal consolidation,   pleural effusion or pneumothorax.    --- End of Report ---    < end of copied text >    < from: CT Abdomen and Pelvis w/ IV Cont (02.24.25 @ 19:46) >    IMPRESSION:    No evidence of acute abdominal pathology.    --- End of Report ---    < end of copied text >    < from: Xray Chest 1 View- PORTABLE-Urgent (02.27.25 @ 00:53) >  Impression:    No radiographic evidence of acute cardiopulmonary disease.        --- End of Report ---    < end of copied text >    < from: CT Abdomen and Pelvis w/ IV Cont (02.27.25 @ 01:35) >    IMPRESSION:    Cholelithiasis and non-specific gallbladder wall edema/thickening.   Correlate with symptoms. Consider HIDA scan for further evaluation as   needed.    Remaining findings without significant change.    --- End of Report ---    < end of copied text >    < from: US Abdomen Upper Quadrant Right (02.27.25 @ 03:36) >  IMPRESSION:  Cholelithiasis and sludge without definite sonographic evidence of acute   cholecystitis. Nonspecific borderline wall thickening.        --- End of Report ---    < end of copied text >        Care Discussed with Consultants/Other Providers

## 2025-02-27 NOTE — H&P ADULT - NSHPLABSRESULTS_GEN_ALL_CORE
Labs:  CAPILLARY BLOOD GLUCOSE                              10.6   5.25  )-----------( 208      ( 2025 00:40 )             32.7       Auto Immature Granulocyte %: 0.4 % (25 @ 00:40)        139  |  101  |  17  ----------------------------<  112[H]  4.2   |  22  |  0.6[L]      Calcium: 8.1 mg/dL (25 @ 00:40)      LFTs:             6.2  | 2.0  | 233      ------------------[479     ( 2025 00:40 )  3.7  | x    | 273         Lipase:29     Amylase:x         Lactate, Blood: 2.0 mmol/L (25 @ 20:31)  Blood Gas Venous - Lactate: 3.3 mmol/L (25 @ 18:42)      Coags:        Urinalysis Basic - ( 2025 00:40 )    Color: Dark Yellow / Appearance: Cloudy / S.028 / pH: x  Gluc: 112 mg/dL / Ketone: >=160 mg/dL  / Bili: Moderate / Urobili: 2.0 mg/dL   Blood: x / Protein: 100 mg/dL / Nitrite: Negative   Leuk Esterase: Small / RBC: 47 /HPF / WBC 3 /HPF   Sq Epi: x / Non Sq Epi: 2 /HPF / Bacteria: Few /HPF      < from: CT Abdomen and Pelvis w/ IV Cont (25 @ 01:35) >    IMPRESSION:    Cholelithiasis and non-specific gallbladder wall edema/thickening.   Correlate with symptoms. Consider HIDA scan for further evaluation as   needed.    Remaining findings without significant change.    < end of copied text >

## 2025-02-28 ENCOUNTER — TRANSCRIPTION ENCOUNTER (OUTPATIENT)
Age: 89
End: 2025-02-28

## 2025-02-28 VITALS
DIASTOLIC BLOOD PRESSURE: 65 MMHG | HEART RATE: 77 BPM | OXYGEN SATURATION: 99 % | SYSTOLIC BLOOD PRESSURE: 110 MMHG | RESPIRATION RATE: 18 BRPM

## 2025-02-28 LAB
A1C WITH ESTIMATED AVERAGE GLUCOSE RESULT: 5.3 % — SIGNIFICANT CHANGE UP (ref 4–5.6)
ALBUMIN SERPL ELPH-MCNC: 3.3 G/DL — LOW (ref 3.5–5.2)
ALLERGY+IMMUNOLOGY DIAG STUDY NOTE: SIGNIFICANT CHANGE UP
ALP SERPL-CCNC: 480 U/L — HIGH (ref 30–115)
ALT FLD-CCNC: 215 U/L — HIGH (ref 0–41)
ANION GAP SERPL CALC-SCNC: 18 MMOL/L — HIGH (ref 7–14)
AST SERPL-CCNC: 127 U/L — HIGH (ref 0–41)
BASOPHILS # BLD AUTO: 0.04 K/UL — SIGNIFICANT CHANGE UP (ref 0–0.2)
BASOPHILS NFR BLD AUTO: 0.7 % — SIGNIFICANT CHANGE UP (ref 0–1)
BILIRUB DIRECT SERPL-MCNC: 0.7 MG/DL — HIGH (ref 0–0.3)
BILIRUB INDIRECT FLD-MCNC: 0.5 MG/DL — SIGNIFICANT CHANGE UP (ref 0.2–1.2)
BILIRUB SERPL-MCNC: 1.2 MG/DL — SIGNIFICANT CHANGE UP (ref 0.2–1.2)
BLD GP AB SCN SERPL QL: SIGNIFICANT CHANGE UP
BUN SERPL-MCNC: 18 MG/DL — SIGNIFICANT CHANGE UP (ref 10–20)
CALCIUM SERPL-MCNC: 7.4 MG/DL — LOW (ref 8.4–10.5)
CHLORIDE SERPL-SCNC: 102 MMOL/L — SIGNIFICANT CHANGE UP (ref 98–110)
CO2 SERPL-SCNC: 21 MMOL/L — SIGNIFICANT CHANGE UP (ref 17–32)
CREAT SERPL-MCNC: 0.6 MG/DL — LOW (ref 0.7–1.5)
CULTURE RESULTS: SIGNIFICANT CHANGE UP
DIR ANTIGLOB POLYSPECIFIC INTERPRETATION: SIGNIFICANT CHANGE UP
EGFR: 86 ML/MIN/1.73M2 — SIGNIFICANT CHANGE UP
EOSINOPHIL # BLD AUTO: 0.17 K/UL — SIGNIFICANT CHANGE UP (ref 0–0.7)
EOSINOPHIL NFR BLD AUTO: 3.1 % — SIGNIFICANT CHANGE UP (ref 0–8)
ESTIMATED AVERAGE GLUCOSE: 105 MG/DL — SIGNIFICANT CHANGE UP (ref 68–114)
GLUCOSE SERPL-MCNC: 64 MG/DL — LOW (ref 70–99)
HAV IGM SER-ACNC: SIGNIFICANT CHANGE UP
HAV IGM SER-ACNC: SIGNIFICANT CHANGE UP
HBV CORE IGM SER-ACNC: SIGNIFICANT CHANGE UP
HBV CORE IGM SER-ACNC: SIGNIFICANT CHANGE UP
HBV SURFACE AB SER-ACNC: REACTIVE — SIGNIFICANT CHANGE UP
HBV SURFACE AG SER-ACNC: SIGNIFICANT CHANGE UP
HBV SURFACE AG SER-ACNC: SIGNIFICANT CHANGE UP
HCV AB S/CO SERPL IA: 0.08 S/CO — SIGNIFICANT CHANGE UP (ref 0–0.79)
HCV AB S/CO SERPL IA: 0.09 S/CO — SIGNIFICANT CHANGE UP (ref 0–0.79)
HCV AB SERPL-IMP: SIGNIFICANT CHANGE UP
HCV AB SERPL-IMP: SIGNIFICANT CHANGE UP
IMM GRANULOCYTES NFR BLD AUTO: 0.6 % — HIGH (ref 0.1–0.3)
LYMPHOCYTES # BLD AUTO: 0.66 K/UL — LOW (ref 1.2–3.4)
LYMPHOCYTES # BLD AUTO: 12.2 % — LOW (ref 20.5–51.1)
MAGNESIUM SERPL-MCNC: 2.3 MG/DL — SIGNIFICANT CHANGE UP (ref 1.8–2.4)
MONOCYTES # BLD AUTO: 0.48 K/UL — SIGNIFICANT CHANGE UP (ref 0.1–0.6)
MONOCYTES NFR BLD AUTO: 8.8 % — SIGNIFICANT CHANGE UP (ref 1.7–9.3)
NEUTROPHILS # BLD AUTO: 4.05 K/UL — SIGNIFICANT CHANGE UP (ref 1.4–6.5)
NEUTROPHILS NFR BLD AUTO: 74.6 % — SIGNIFICANT CHANGE UP (ref 42.2–75.2)
NRBC BLD AUTO-RTO: 0 /100 WBCS — SIGNIFICANT CHANGE UP (ref 0–0)
PHOSPHATE SERPL-MCNC: 3.1 MG/DL — SIGNIFICANT CHANGE UP (ref 2.1–4.9)
POTASSIUM SERPL-MCNC: 4.1 MMOL/L — SIGNIFICANT CHANGE UP (ref 3.5–5)
POTASSIUM SERPL-SCNC: 4.1 MMOL/L — SIGNIFICANT CHANGE UP (ref 3.5–5)
PROT SERPL-MCNC: 5.2 G/DL — LOW (ref 6–8)
SODIUM SERPL-SCNC: 141 MMOL/L — SIGNIFICANT CHANGE UP (ref 135–146)
SPECIMEN SOURCE: SIGNIFICANT CHANGE UP
T4 AB SER-ACNC: 7.2 UG/DL — SIGNIFICANT CHANGE UP (ref 4.6–12)
TSH SERPL-MCNC: 0.75 UIU/ML — SIGNIFICANT CHANGE UP (ref 0.27–4.2)

## 2025-02-28 PROCEDURE — 99232 SBSQ HOSP IP/OBS MODERATE 35: CPT

## 2025-02-28 PROCEDURE — 99232 SBSQ HOSP IP/OBS MODERATE 35: CPT | Mod: 57

## 2025-02-28 RX ORDER — INFLUENZA A VIRUS A/IDAHO/07/2018 (H1N1) ANTIGEN (MDCK CELL DERIVED, PROPIOLACTONE INACTIVATED, INFLUENZA A VIRUS A/INDIANA/08/2018 (H3N2) ANTIGEN (MDCK CELL DERIVED, PROPIOLACTONE INACTIVATED), INFLUENZA B VIRUS B/SINGAPORE/INFTT-16-0610/2016 ANTIGEN (MDCK CELL DERIVED, PROPIOLACTONE INACTIVATED), INFLUENZA B VIRUS B/IOWA/06/2017 ANTIGEN (MDCK CELL DERIVED, PROPIOLACTONE INACTIVATED) 15; 15; 15; 15 UG/.5ML; UG/.5ML; UG/.5ML; UG/.5ML
0.5 INJECTION, SUSPENSION INTRAMUSCULAR ONCE
Refills: 0 | Status: DISCONTINUED | OUTPATIENT
Start: 2025-02-28 | End: 2025-02-28

## 2025-02-28 RX ORDER — AMOXICILLIN AND CLAVULANATE POTASSIUM 500; 125 MG/1; MG/1
1 TABLET, FILM COATED ORAL
Qty: 14 | Refills: 0
Start: 2025-02-28 | End: 2025-03-06

## 2025-02-28 RX ORDER — ENOXAPARIN SODIUM 100 MG/ML
40 INJECTION SUBCUTANEOUS EVERY 24 HOURS
Refills: 0 | Status: DISCONTINUED | OUTPATIENT
Start: 2025-02-28 | End: 2025-02-28

## 2025-02-28 RX ORDER — POTASSIUM CHLORIDE, DEXTROSE MONOHYDRATE AND SODIUM CHLORIDE 150; 5; 900 MG/100ML; G/100ML; MG/100ML
1000 INJECTION, SOLUTION INTRAVENOUS
Refills: 0 | Status: DISCONTINUED | OUTPATIENT
Start: 2025-03-01 | End: 2025-02-28

## 2025-02-28 RX ADMIN — Medication 40 MILLIGRAM(S): at 11:35

## 2025-02-28 RX ADMIN — SODIUM CHLORIDE 80 MILLILITER(S): 9 INJECTION, SOLUTION INTRAVENOUS at 06:03

## 2025-02-28 RX ADMIN — Medication 650 MILLIGRAM(S): at 06:03

## 2025-02-28 RX ADMIN — Medication 650 MILLIGRAM(S): at 11:34

## 2025-02-28 RX ADMIN — METOPROLOL SUCCINATE 50 MILLIGRAM(S): 50 TABLET, EXTENDED RELEASE ORAL at 06:03

## 2025-02-28 RX ADMIN — Medication 25 GRAM(S): at 06:04

## 2025-02-28 RX ADMIN — Medication 25 GRAM(S): at 13:08

## 2025-02-28 RX ADMIN — HYDROXYUREA 500 MILLIGRAM(S): 500 CAPSULE ORAL at 13:07

## 2025-02-28 RX ADMIN — Medication 650 MILLIGRAM(S): at 00:07

## 2025-02-28 RX ADMIN — Medication 25 GRAM(S): at 00:07

## 2025-02-28 RX ADMIN — SODIUM CHLORIDE 80 MILLILITER(S): 9 INJECTION, SOLUTION INTRAVENOUS at 00:06

## 2025-02-28 NOTE — PATIENT PROFILE ADULT - FALL HARM RISK - HARM RISK INTERVENTIONS

## 2025-02-28 NOTE — CHART NOTE - NSCHARTNOTESELECT_GEN_ALL_CORE
Continued Stay Note  [facility]     Patient Name: Natalia Arzate  MRN: [id number]  Today's Date: 1/31/2025    Admit Date: 1/2/2025    Plan: [facility]   Discharge Plan       Row Name 01/31/25 1329       Plan    Plan [facility]    Plan Comments Per discussion in MDR, Pt passed FEES today and was progressed to a regular diet. She is on room air. She walked 2ft with a 2 person assist and a walker yesterday. Per April, precert is still pending for [facility]. A tentative EMS is scheduled for tomorrow, 2/1 @ 1200 to [facility]. The weekend  will check with facility regarding precert and provide update. CM will continue to follow.    Final Discharge Disposition Code 62 - inpatient rehab facility                   Discharge Codes    No documentation.                 Expected Discharge Date and Time       Expected Discharge Date Expected Discharge Time    Jan 31, 2025               Pallavi Donohue RN    
Event Note
surgery

## 2025-02-28 NOTE — PROGRESS NOTE ADULT - SUBJECTIVE AND OBJECTIVE BOX
SUBJECTIVE:  Patient seen and examined at bedside. No acute overnight events. No acute complaints this morning. ROS otherwise negative on a 10-point assessment.     PAST MEDICAL & SURGICAL HISTORY:  HLD (hyperlipidemia)      Anemia        SOCIAL HISTORY:  Tobacco Usage:  (   ) never smoked   (   ) former smoker   (   ) current smoker  (     ) pack years    Tobacco Quit Date:  Substance Use (Street drugs): (  ) never used  (  ) other:  Alcohol Usage:    Family history reviewed and otherwise non-contributory  ALLERGIES: No Known Allergies    MEDICATIONS:  MEDICATIONS  (STANDING):  acetaminophen     Tablet .. 650 milliGRAM(s) Oral every 6 hours  hydroxyurea 500 milliGRAM(s) Oral daily  lactated ringers. 1000 milliLiter(s) (80 mL/Hr) IV Continuous <Continuous>  metoprolol succinate ER 50 milliGRAM(s) Oral daily  piperacillin/tazobactam IVPB.. 3.375 Gram(s) IV Intermittent every 8 hours    MEDICATIONS  (PRN):  calcium carbonate   1250 mG (OsCal) 1 Tablet(s) Oral daily PRN Heartburn  ketorolac   Injectable 15 milliGRAM(s) IV Push every 6 hours PRN Severe Pain (7 - 10)      Home Medications:  hydroxyurea 500 mg oral capsule: 15 mg/kg orally once a day (25 Feb 2025 01:18)      Vital Signs Last 24 Hrs  T(C): 36.7 (28 Feb 2025 04:30), Max: 36.7 (27 Feb 2025 21:33)  T(F): 98 (28 Feb 2025 04:30), Max: 98 (27 Feb 2025 21:33)  HR: 85 (28 Feb 2025 04:30) (77 - 85)  BP: 130/73 (28 Feb 2025 04:30) (114/56 - 130/73)  BP(mean): 87 (27 Feb 2025 21:33) (75 - 87)  RR: 18 (28 Feb 2025 04:30) (16 - 18)  SpO2: 100% (28 Feb 2025 04:30) (96% - 100%)    Parameters below as of 28 Feb 2025 04:30  Patient On (Oxygen Delivery Method): room air      PHYSICAL EXAM:  GENERAL: NAD, well-groomed, well-developed  HEAD:  Atraumatic, Normocephalic  EYES: EOMI, PERRLA, conjunctiva and sclera clear  ENMT: No tonsillar erythema, exudates, or enlargement; Moist mucous membranes  NECK: Supple, No JVD  CHEST/LUNG: Clear to auscultation bilaterally; No rales, rhonchi, wheezing, or rubs  HEART: Regular rate and rhythm; S1/S2, No murmurs, rubs, or gallops  ABDOMEN: Soft, Nontender, Nondistended; Bowel sounds present x4 quadrants  VASCULAR: Normal pulses, Normal capillary refill  EXTREMITIES:  2+ Peripheral Pulses, No cyanosis, No edema  SKIN: Warm, Intact  PSYCH: Normal mood and affect  NERVOUS SYSTEM:  AAOx3, No focal deficits, SILT      LABS:                      9.7    5.43  )-----------( 217      ( 27 Feb 2025 23:10 )             29.7     02-27    141  |  102  |  18  ----------------------------<  64  4.1   |  21  |  0.6    Ca    7.4      27 Feb 2025 23:10  Phos  3.1     02-27  Mg     2.3     02-27    TPro  5.2  /  Alb  3.3  /  TBili  1.2  /  DBili  0.7  /  AST  127  /  ALT  215  /  AlkPhos  480  02-27    eGFR: 86 mL/min/1.73m2 (27 Feb 2025 23:10)  eGFR: 83 mL/min/1.73m2 (27 Feb 2025 11:44)        PTT - ( 27 Feb 2025 11:44 )  PTT:52.6 sec  Urinalysis Basic - ( 27 Feb 2025 11:44 )    Color: x / Appearance: x / SG: x / pH: x  Gluc: 103 mg/dL / Ketone: x  / Bili: x / Urobili: x   Blood: x / Protein: x / Nitrite: x   Leuk Esterase: x / RBC: x / WBC x   Sq Epi: x / Non Sq Epi: x / Bacteria: x    Lactate, Blood: 2.0 mmol/L (02-24 @ 20:31)      TSH 1.75   TSH with FT4 reflex --  Total T3 --        RADIOLOGY & ADDITIONAL TESTS:    < from: 12 Lead ECG (02.27.25 @ 05:59) >  Ventricular Rate 86 BPM    Atrial Rate 86 BPM    P-R Interval 172 ms    QRS Duration 90 ms    Q-T Interval 400 ms    QTC Calculation(Bazett) 478 ms    P Axis 69 degrees    R Axis 62 degrees    T Axis 44 degrees    Diagnosis Line Normal sinus rhythm  Cannot rule out Anterior infarct , age undetermined  Abnormal ECG    < end of copied text >    < from: TTE Echo Complete w/o Contrast w/ Doppler (02.26.25 @ 08:07) >  Summary:   1. Normal global left ventricular systolic function with a biplane EF of   63%. Moderate (grade 2) diastolic dysfunction. No regional wall motion   abnormalities.   2. Normal right ventricular size and function.   3. Mildly enlarged left atrium.   4. Normal right atrial size.   5. Posterior mitral leaflet prolapse with severe anteriorly directed   mitral regurgitation.   6. Severe tricuspid regurgitation.   7. Sclerotic aortic valve with normal opening and mild to moderate   aortic regurgitation.   8. Severe pulmonary hypertension. The IVC is normal in size with >50%   respiratory variability.   9. There is no evidence of pericardial effusion.    PHYSICIAN INTERPRETATION:  Left Ventricle: The left ventricular internal cavity size is normal.   There is no left ventricular hypertrophy. Global LV systolic function was   normal. Spectral Doppler shows pseudonormal pattern of left ventricular   myocardial filling (Grade II diastolic dysfunction).  Right Ventricle: Normal right ventricular size and function.  Left Atrium: Mildly enlarged left atrium. LA volume Index is 38.0 ml/m²   ml/m2.  Right Atrium: Normal right atrial size.  Pericardium: There is no evidence of pericardial effusion.  Mitral Valve: No evidence of mitral valve stenosis. Severe mitral valve   regurgitation is seen. Posterior mitral leaflet prolapse.  Tricuspid Valve: Structurally normal tricuspid valve, with normal leaflet   excursion. Severe tricuspid regurgitation is visualized. Estimated   pulmonary artery systolic pressure is 73.2 mmHg assuming a right atrial   pressure of 3mmHg, which is consistent with severe pulmonary   hypertension.  Aortic Valve: The aortic valve is trileaflet. Sclerotic aortic valve with   normal opening. Mild to moderate aortic valve regurgitation is seen.  Pulmonic Valve: Structurally normal pulmonic valve, with normal leaflet   excursion. Trace pulmonic valve regurgitation.  Aorta: The aortic root and ascending aorta are structurally normal, with   no evidence of dilitation.  Venous: The inferior vena cava was normal sized, with respiratorysize   variation greater than 50%.    < end of copied text >    < from: Xray Chest 1 View- PORTABLE-Urgent (02.24.25 @ 18:59) >  Findings/  impression:    Support devices: None.    Cardiac/mediastinum/hilum: No significant change    Skeleton/soft tissues: No significant change.    Lung parenchyma/Pleura: There is no evidence of focal consolidation,   pleural effusion or pneumothorax.    --- End of Report ---    < end of copied text >    < from: CT Abdomen and Pelvis w/ IV Cont (02.24.25 @ 19:46) >    IMPRESSION:    No evidence of acute abdominal pathology.    --- End of Report ---    < end of copied text >    < from: Xray Chest 1 View- PORTABLE-Urgent (02.27.25 @ 00:53) >  Impression:    No radiographic evidence of acute cardiopulmonary disease.        --- End of Report ---    < end of copied text >    < from: CT Abdomen and Pelvis w/ IV Cont (02.27.25 @ 01:35) >    IMPRESSION:    Cholelithiasis and non-specific gallbladder wall edema/thickening.   Correlate with symptoms. Consider HIDA scan for further evaluation as   needed.    Remaining findings without significant change.    --- End of Report ---    < end of copied text >    < from: US Abdomen Upper Quadrant Right (02.27.25 @ 03:36) >  IMPRESSION:  Cholelithiasis and sludge without definite sonographic evidence of acute   cholecystitis. Nonspecific borderline wall thickening.        --- End of Report ---    < end of copied text >        Care Discussed with Consultants/Other Providers

## 2025-02-28 NOTE — DISCHARGE NOTE PROVIDER - NSDCMRMEDTOKEN_GEN_ALL_CORE_FT
amoxicillin-clavulanate 875 mg-125 mg oral tablet: 1 tab(s) orally 2 times a day  calcium carbonate 1250 mg (500 mg elemental calcium) oral tablet: 1 tab(s) orally 2 times a day  Eliquis 2.5 mg oral tablet: 1 tab(s) orally 2 times a day  ergocalciferol 50 mcg (2000 intl units) oral capsule: 1 cap(s) orally once a day  hydroxyurea 500 mg oral capsule: 15 mg/kg orally once a day  metoprolol succinate 50 mg oral capsule, extended release: 1 cap(s) orally once a day

## 2025-02-28 NOTE — PROGRESS NOTE ADULT - ATTENDING COMMENTS
Patient has mild abdominal pain.  Afebrile.    PE:  AAO x3  Chest: clear.  CV : RRR  Abd: mildly tender in epigastrium    ASSESSMENT:  88 y/o female with Choledocholithiasis.  Acute calculous cholecystitis.  A.fib.    PLAN:  - NPO, IVF  - iv Zosyn  - ERCP today  - patient scheduled for Robotic Cholecystectomy, Possible Laparoscopic, Possible Open, Possible IOC    Informed consent was obtained from the patient for the above procedure with eulaliator from [place] after explaining all the risks and benefits of it including but not limited to infection, bleeding and etc. She understood and agreed. All questions were answered.

## 2025-02-28 NOTE — DISCHARGE NOTE NURSING/CASE MANAGEMENT/SOCIAL WORK - FINANCIAL ASSISTANCE
[facility] provides services at a reduced cost to those who are determined to be eligible through [facility]’s financial assistance program. Information regarding [facility]’s financial assistance program can be found by going to [email or web address] or by calling [phone].

## 2025-02-28 NOTE — PROGRESS NOTE ADULT - SUBJECTIVE AND OBJECTIVE BOX
GENERAL SURGERY PROGRESS NOTE     VERONICA GILMORE  [address] [place] [address] day :2d    POD:  Procedure:   Surgical Attending: Siva Alamo  Overnight events: No acute events overnight. Pt reports tenderness to palpation in RUQ. Pt is NPO in preparation for Possible ERCP with adv GI and possible robotic/laparoscopic cholecystectomy 2/28    T(F): 98 (02-27-25 @ 21:33), Max: 98 (02-27-25 @ 21:33)  HR: 77 (02-27-25 @ 21:33) (77 - 80)  BP: 127/68 (02-27-25 @ 21:33) (114/56 - 127/68)  ABP: --  ABP(mean): --  RR: 16 (02-27-25 @ 21:33) (16 - 18)  SpO2: 97% (02-27-25 @ 21:33) (96% - 97%)    IN'S / OUT's:      PHYSICAL EXAM:  GENERAL: NAD  CHEST/LUNG: equal chest rise b/l  HEART: Regular rate and rhythm  ABDOMEN: Soft, tender to palpation in RUQ, Nondistended;   EXTREMITIES:  No clubbing, cyanosis, or edema      LABS  Labs:  CAPILLARY BLOOD GLUCOSE                              9.7    5.43  )-----------( 217      ( 27 Feb 2025 23:10 )             29.7       Auto Immature Granulocyte %: 0.6 % (02-27-25 @ 23:10)    02-27    141  |  102  |  18  ----------------------------<  64[L]  4.1   |  21  |  0.6[L]      Calcium: 7.4 mg/dL (02-27-25 @ 23:10)      LFTs:             5.2  | 1.2  | 127      ------------------[480     ( 27 Feb 2025 23:10 )  3.3  | 0.7  | 215         Lipase:x      Amylase:x             Coags:     12.10  ----< 1.02    ( 27 Feb 2025 23:10 )     x                   Urinalysis Basic - ( 27 Feb 2025 23:10 )    Color: x / Appearance: x / SG: x / pH: x  Gluc: 64 mg/dL / Ketone: x  / Bili: x / Urobili: x   Blood: x / Protein: x / Nitrite: x   Leuk Esterase: x / RBC: x / WBC x   Sq Epi: x / Non Sq Epi: x / Bacteria: x            RADIOLOGY & ADDITIONAL TESTS:      A/P:  VERONICA GILMORE is a 89 yo female with PMH anemia, thrombocytosis, recent admission for new onset  Afib RVR, lap appy with Dr. Anderson 04/2024 who presents to the ED complaining of abdominal pain for the past two days. Clinical presentation and radiographic findings consistent with acute cholecystiits.       PLAN:   - f/u GI for poss ERCP 2/28  - pt tentatively scheduled for robotic, possible laparoscopic cholecystectomy 2/28  - Keep NPO for procedure   - continue ABX  - multi modal pain control  - daily labs, replete electrolytes as needed   - trend LFT's      #Antibiotics: piperacillin/tazobactam IVPB.. 3.375 Gram(s) IV Intermittent every 8 hours, 02-27-25 @ 14:40   Day **    Disposition:  4C    Above plan to be discussed with Attending Surgeon Dr. Anderson  , patient, patient family, and rest of health care team    TAP (Trauma, Acute care, Pediatrics) Spectra [phone]

## 2025-02-28 NOTE — PATIENT PROFILE ADULT - FUNCTIONAL ASSESSMENT - DAILY ACTIVITY SECTION LABEL
Prescription refill for Atorvastatin 40 mg daily # 90 with no refills with note that patient needs labs.   .

## 2025-02-28 NOTE — DISCHARGE NOTE NURSING/CASE MANAGEMENT/SOCIAL WORK - PATIENT PORTAL LINK FT
You can access the FollowMyHealth Patient Portal offered by [facility] by registering at the following website: [email or web address]. By joining [employer]’s FollowMyHealth portal, you will also be able to view your health information using other applications (apps) compatible with our system.

## 2025-02-28 NOTE — PROGRESS NOTE ADULT - ASSESSMENT
Patient is a 88 y/o female with a PMHx of anemia, thrombocytosis, recent admission for new onset  Afib RVR, calvin appy with Dr. Anderson 04/2024 who presents to the ED complaining of abdominal pain for the two days prior to admission. She is admitted to surgical team. She has an intact Gallbladder with wall thickening and stones. CBD is 3-4mm with T floyd now of 2.0. Medicine consulted for comanagement.      Problem list:  #Paroxsymal Afib  #Biliary colic with Cholelithiasis   #Transaminitis  #HFpEF  # Severe MR/TR   #Severe Pulm HTN   #Hypocalcemia  #Macrocytic anemia     Plan:  Management per Surgery  All imaging, labs, and vitals personally reviewed   - PT/OOBTC/AAT  - Multimodal pain regimen   - Bowel regimen PRN / stool count   - Monitor H&H and for signs of bleeding; transfuse to maintain Hgb >8  - Monitor for fever and WBC trend   - Planned for EUS +/- ERCP this AM, however pt is now refusing >> LF diet, d/c if tolerating diet, outpt f/u  - Cardio rec therapeutic AC; start DOAC if no surg intervention planned    - c/w Toprol XL 50mg qd  - f/u TSH/fT4, A1c  - check B12, folate lvls   - Give Calcium gluconate 1 gram IV x1  - AM CMP/Mg; monitor lytes/LFTs  - Incentive spirometer 10x/hr while awake  - Outpt Structural Cardiology   - noted IVF; strict I/O's / daily weight / 1.5L fluid restriction / avoid fluid overload  - Supplemental oxygen PRN to maintain PO2 >92%; currently on RA sat well at 96%      If any questions, please send a message or call on [employer] Teams.    Management per Surgery, Medicine for co-management.    Total time spent to complete patient's bedside assessment, reviewed medical chart, discussed medical plan of care with team was more than 35 minutes with >50% of time spent face to face with patient, discussion with patient/family and/or coordination of care.

## 2025-02-28 NOTE — DISCHARGE NOTE PROVIDER - NSFOLLOWUPCLINICS_GEN_ALL_ED_FT
[facility]  Cardiology  [address] [place] [address], [address]  [place] [address]  Phone: [phone]  Fax: [phone]  Follow Up Time: 1 week

## 2025-02-28 NOTE — DISCHARGE NOTE PROVIDER - NSDCCPCAREPLAN_GEN_ALL_CORE_FT
PRINCIPAL DISCHARGE DIAGNOSIS  Diagnosis: Cholelithiasis  Assessment and Plan of Treatment:   Pain control: You may take over-the-counter tylenol and motrin three times per day with food for up to 3 days.   Medications: You may take your home medications as prescribed. Antibiotics were sent to your pharmacy, please take as prescribed.   Follow up: Please call the number provided to make an appointment with Dr. Anderson in 1-2 weeks. Please call with any questions or concerns including fevers, worsening pain, nausea, vomiting.      SECONDARY DISCHARGE DIAGNOSES  Diagnosis: Abdominal pain  Assessment and Plan of Treatment:

## 2025-02-28 NOTE — DISCHARGE NOTE PROVIDER - HOSPITAL COURSE
Patient is a 89 yo female with PMH anemia, thrombocytosis, recent admission for new onset  Afib RVR, lap appy with Dr. Anderson 04/2024 who presents to the ED complaining of abdominal pain for the past two days. The patient is Occitan speaking who had daughter on phone. The patient reports that she had abdominal pain for the past 2 days. The patient reports that she had no nausea no vomiting no fever or chills. The patient reports pain is located in the RUQ. The patient reports that this happened before in past a week ago but pain subsided. Clinical presentation and radiographic findings consistent with acute cholecystitis. Patient admitted to surgery floor with IV abx. Cardiology consulted for afib, recommended apixaban 2.5 BID. GI consulted for EUS/ERCP, sent for patient to perform EUS/ERCP which she agreed upon yesterday, this morning she now refuses. Patient was seen and examined during AM rounds with Dr. Anderson. Patient initially agreed yesterday to robotic cholecystectomy, possible laparoscopic, possible open, but is now refusing surgical intervention this morning. Patient and patient family understands risk and benefit of proceeding without any surgical intervention. Patient is now started on low fat diet with follow up outpatient. Patient will be discharged with PO antibiotics.

## 2025-02-28 NOTE — PATIENT PROFILE ADULT - NSPROPTRIGHTSUPPORTPERSON_GEN_A_NUR
Nurse visit immunizations administered per [facility] APRN-CNP, please see immunization record.
declines

## 2025-02-28 NOTE — CHART NOTE - NSCHARTNOTEFT_GEN_A_CORE
Patient was seen and examined during AM rounds with Dr. Anderson     Patient initially agreed yesterday to robotic cholecystectomy, possible laparoscopic, possible open, but is now refusing surgical intervention this morning. Patient and patient family understands risk and benefit of proceeding without any surgical intervention.     Plan:   - Low fat diet   - Pain control with tylenol/motrin   - May discharge home when tolerates low fat diet   - Follow up outpatient with Dr. Anderson Patient was seen and examined during AM rounds with Dr. Anderson     Patient initially agreed yesterday to robotic cholecystectomy, possible laparoscopic, possible open, but is now refusing surgical intervention this morning. Patient and patient family understands risk and benefit of proceeding without any surgical intervention.     Plan:   - PO diet   - Pain control with tylenol/motrin   - May discharge home when tolerates low fat diet   - Follow up outpatient with Dr. Adnerson

## 2025-02-28 NOTE — CHART NOTE - NSCHARTNOTEFT_GEN_A_CORE
Sent for patient to perform EUS/ERCP which she agreed upon yesterday, this morning she now refuses.   - Slot for procedure is now lost  - Pain improved and LFT significantly downtrending   - Re-Call [facility] as needed- If agrees to [facility] can consider IOC

## 2025-02-28 NOTE — DISCHARGE NOTE PROVIDER - CARE PROVIDER_API CALL
Kang Anderson  Surgical Critical Care  [address] [place] [address], [address] [place] [address]  Phone: [phone]  Fax: [phone]  Follow Up Time: 1 week

## 2025-03-04 DIAGNOSIS — I08.1 RHEUMATIC DISORDERS OF BOTH MITRAL AND TRICUSPID VALVES: ICD-10-CM

## 2025-03-04 DIAGNOSIS — Z53.20 PROCEDURE AND TREATMENT NOT CARRIED OUT BECAUSE OF PATIENT'S DECISION FOR UNSPECIFIED REASONS: ICD-10-CM

## 2025-03-04 DIAGNOSIS — I50.32 CHRONIC DIASTOLIC (CONGESTIVE) HEART FAILURE: ICD-10-CM

## 2025-03-04 DIAGNOSIS — K80.62 CALCULUS OF GALLBLADDER AND BILE DUCT WITH ACUTE CHOLECYSTITIS WITHOUT OBSTRUCTION: ICD-10-CM

## 2025-03-04 DIAGNOSIS — K57.30 DIVERTICULOSIS OF LARGE INTESTINE WITHOUT PERFORATION OR ABSCESS WITHOUT BLEEDING: ICD-10-CM

## 2025-03-04 DIAGNOSIS — E78.5 HYPERLIPIDEMIA, UNSPECIFIED: ICD-10-CM

## 2025-03-04 DIAGNOSIS — D64.9 ANEMIA, UNSPECIFIED: ICD-10-CM

## 2025-03-04 DIAGNOSIS — E83.51 HYPOCALCEMIA: ICD-10-CM

## 2025-03-04 DIAGNOSIS — R74.01 ELEVATION OF LEVELS OF LIVER TRANSAMINASE LEVELS: ICD-10-CM

## 2025-03-04 DIAGNOSIS — I48.91 UNSPECIFIED ATRIAL FIBRILLATION: ICD-10-CM

## 2025-03-04 DIAGNOSIS — I11.0 HYPERTENSIVE HEART DISEASE WITH HEART FAILURE: ICD-10-CM

## 2025-03-04 DIAGNOSIS — I48.0 PAROXYSMAL ATRIAL FIBRILLATION: ICD-10-CM

## 2025-03-04 DIAGNOSIS — D84.81 IMMUNODEFICIENCY DUE TO CONDITIONS CLASSIFIED ELSEWHERE: ICD-10-CM

## 2025-03-04 DIAGNOSIS — I08.3 COMBINED RHEUMATIC DISORDERS OF MITRAL, AORTIC AND TRICUSPID VALVES: ICD-10-CM

## 2025-03-04 DIAGNOSIS — I50.9 HEART FAILURE, UNSPECIFIED: ICD-10-CM

## 2025-03-04 DIAGNOSIS — K59.00 CONSTIPATION, UNSPECIFIED: ICD-10-CM

## 2025-03-04 DIAGNOSIS — I27.20 PULMONARY HYPERTENSION, UNSPECIFIED: ICD-10-CM

## 2025-06-12 ENCOUNTER — NON-APPOINTMENT (OUTPATIENT)
Age: 89
End: 2025-06-12